# Patient Record
Sex: FEMALE | Race: WHITE | NOT HISPANIC OR LATINO | Employment: OTHER | RURAL
[De-identification: names, ages, dates, MRNs, and addresses within clinical notes are randomized per-mention and may not be internally consistent; named-entity substitution may affect disease eponyms.]

---

## 2021-04-27 RX ORDER — CYCLOBENZAPRINE HCL 10 MG
10 TABLET ORAL EVERY 8 HOURS
COMMUNITY
End: 2021-04-29 | Stop reason: SDUPTHER

## 2021-04-27 RX ORDER — MONTELUKAST SODIUM 10 MG/1
10 TABLET ORAL NIGHTLY
COMMUNITY

## 2021-04-27 RX ORDER — ONDANSETRON HYDROCHLORIDE 8 MG/1
TABLET, FILM COATED ORAL EVERY 8 HOURS PRN
COMMUNITY

## 2021-04-27 RX ORDER — POTASSIUM CHLORIDE 20 MEQ/1
20 TABLET, EXTENDED RELEASE ORAL 2 TIMES DAILY
COMMUNITY

## 2021-04-27 RX ORDER — HYDROCODONE BITARTRATE AND ACETAMINOPHEN 10; 325 MG/1; MG/1
1 TABLET ORAL EVERY 8 HOURS
COMMUNITY
End: 2021-04-29 | Stop reason: SDUPTHER

## 2021-04-27 RX ORDER — VENLAFAXINE 100 MG/1
TABLET ORAL DAILY
COMMUNITY

## 2021-04-27 RX ORDER — PRIMIDONE 50 MG/1
TABLET ORAL EVERY 8 HOURS
COMMUNITY

## 2021-04-29 ENCOUNTER — OFFICE VISIT (OUTPATIENT)
Dept: PAIN MEDICINE | Facility: CLINIC | Age: 76
End: 2021-04-29
Payer: MEDICARE

## 2021-04-29 VITALS
BODY MASS INDEX: 24.24 KG/M2 | RESPIRATION RATE: 18 BRPM | SYSTOLIC BLOOD PRESSURE: 105 MMHG | HEART RATE: 108 BPM | DIASTOLIC BLOOD PRESSURE: 73 MMHG | HEIGHT: 61 IN | WEIGHT: 128.38 LBS

## 2021-04-29 DIAGNOSIS — M53.3 DISORDER OF SACRUM: Chronic | ICD-10-CM

## 2021-04-29 DIAGNOSIS — M54.17 LUMBOSACRAL RADICULOPATHY: Primary | Chronic | ICD-10-CM

## 2021-04-29 DIAGNOSIS — G89.4 CHRONIC PAIN SYNDROME: Chronic | ICD-10-CM

## 2021-04-29 DIAGNOSIS — M47.814 THORACIC SPONDYLOSIS: Chronic | ICD-10-CM

## 2021-04-29 PROCEDURE — 99214 OFFICE O/P EST MOD 30 MIN: CPT | Mod: PBBFAC,25 | Performed by: PHYSICIAN ASSISTANT

## 2021-04-29 PROCEDURE — 99214 OFFICE O/P EST MOD 30 MIN: CPT | Mod: S$PBB,25,, | Performed by: PHYSICIAN ASSISTANT

## 2021-04-29 PROCEDURE — 99214 PR OFFICE/OUTPT VISIT, EST, LEVL IV, 30-39 MIN: ICD-10-PCS | Mod: S$PBB,25,, | Performed by: PHYSICIAN ASSISTANT

## 2021-04-29 PROCEDURE — 96372 THER/PROPH/DIAG INJ SC/IM: CPT | Mod: PBBFAC | Performed by: PHYSICIAN ASSISTANT

## 2021-04-29 PROCEDURE — 99999 PR PBB SHADOW E&M-EST. PATIENT-LVL IV: CPT | Mod: PBBFAC,,, | Performed by: PHYSICIAN ASSISTANT

## 2021-04-29 PROCEDURE — 99999 PR PBB SHADOW E&M-EST. PATIENT-LVL IV: ICD-10-PCS | Mod: PBBFAC,,, | Performed by: PHYSICIAN ASSISTANT

## 2021-04-29 RX ORDER — CYCLOBENZAPRINE HCL 10 MG
10 TABLET ORAL EVERY 8 HOURS
Qty: 90 TABLET | Refills: 0 | Status: SHIPPED | OUTPATIENT
Start: 2021-04-29 | End: 2021-05-29

## 2021-04-29 RX ORDER — KETOROLAC TROMETHAMINE 30 MG/ML
60 INJECTION, SOLUTION INTRAMUSCULAR; INTRAVENOUS
Status: COMPLETED | OUTPATIENT
Start: 2021-04-29 | End: 2021-04-29

## 2021-04-29 RX ORDER — HYDROCODONE BITARTRATE AND ACETAMINOPHEN 10; 325 MG/1; MG/1
1 TABLET ORAL EVERY 8 HOURS
Qty: 90 TABLET | Refills: 0 | Status: SHIPPED | OUTPATIENT
Start: 2021-05-07 | End: 2021-06-06

## 2021-04-29 RX ADMIN — KETOROLAC TROMETHAMINE 60 MG: 30 INJECTION, SOLUTION INTRAMUSCULAR at 01:04

## 2021-05-04 ENCOUNTER — HOSPITAL ENCOUNTER (OUTPATIENT)
Dept: RADIOLOGY | Facility: HOSPITAL | Age: 76
Discharge: HOME | End: 2021-05-04
Attending: ORTHOPAEDIC SURGERY
Payer: MEDICARE

## 2021-05-04 DIAGNOSIS — Z47.89 ORTHOPEDIC AFTERCARE: ICD-10-CM

## 2021-05-04 PROCEDURE — 73552 X-RAY EXAM OF FEMUR 2/>: CPT | Mod: TC,RT

## 2021-06-01 ENCOUNTER — OFFICE VISIT (OUTPATIENT)
Dept: PAIN MEDICINE | Facility: CLINIC | Age: 76
End: 2021-06-01
Payer: MEDICARE

## 2021-06-01 VITALS
RESPIRATION RATE: 18 BRPM | HEART RATE: 101 BPM | BODY MASS INDEX: 24.17 KG/M2 | DIASTOLIC BLOOD PRESSURE: 78 MMHG | HEIGHT: 61 IN | SYSTOLIC BLOOD PRESSURE: 131 MMHG | WEIGHT: 128 LBS

## 2021-06-01 DIAGNOSIS — M79.7 FIBROMYALGIA SYNDROME: Chronic | ICD-10-CM

## 2021-06-01 DIAGNOSIS — M47.817 LUMBOSACRAL SPONDYLOSIS WITHOUT MYELOPATHY: Primary | Chronic | ICD-10-CM

## 2021-06-01 DIAGNOSIS — M53.3 DISORDER OF SACRUM: Chronic | ICD-10-CM

## 2021-06-01 DIAGNOSIS — M47.814 THORACIC SPONDYLOSIS: Chronic | ICD-10-CM

## 2021-06-01 DIAGNOSIS — Z79.899 ENCOUNTER FOR LONG-TERM (CURRENT) USE OF OTHER MEDICATIONS: ICD-10-CM

## 2021-06-01 DIAGNOSIS — M54.17 LUMBOSACRAL RADICULOPATHY: Chronic | ICD-10-CM

## 2021-06-01 LAB
CTP QC/QA: YES
POC (AMP) AMPHETAMINE: NEGATIVE
POC (BAR) BARBITURATES: ABNORMAL
POC (BUP) BUPRENORPHINE: NEGATIVE
POC (BZO) BENZODIAZEPINES: NEGATIVE
POC (COC) COCAINE: NEGATIVE
POC (MDMA) METHYLENEDIOXYMETHAMPHETAMINE 3,4: NEGATIVE
POC (MET) METHAMPHETAMINE: NEGATIVE
POC (MOP) OPIATES: ABNORMAL
POC (MTD) METHADONE: NEGATIVE
POC (OXY) OXYCODONE: NEGATIVE
POC (PCP) PHENCYCLIDINE: NEGATIVE
POC (TCA) TRICYCLIC ANTIDEPRESSANTS: NEGATIVE
POC TEMPERATURE (URINE): 92
POC THC: NEGATIVE

## 2021-06-01 PROCEDURE — 99215 OFFICE O/P EST HI 40 MIN: CPT | Mod: PBBFAC | Performed by: PHYSICIAN ASSISTANT

## 2021-06-01 PROCEDURE — 80305 DRUG TEST PRSMV DIR OPT OBS: CPT | Mod: PBBFAC | Performed by: PHYSICIAN ASSISTANT

## 2021-06-01 PROCEDURE — 99214 OFFICE O/P EST MOD 30 MIN: CPT | Mod: S$PBB,,, | Performed by: PHYSICIAN ASSISTANT

## 2021-06-01 PROCEDURE — G0481 DRUG TEST DEF 8-14 CLASSES: HCPCS | Mod: ,,, | Performed by: CLINICAL MEDICAL LABORATORY

## 2021-06-01 PROCEDURE — 99214 PR OFFICE/OUTPT VISIT, EST, LEVL IV, 30-39 MIN: ICD-10-PCS | Mod: S$PBB,,, | Performed by: PHYSICIAN ASSISTANT

## 2021-06-01 PROCEDURE — G0481 DRUG SCREEN DEFINITIVE 14, URINE: ICD-10-PCS | Mod: ,,, | Performed by: CLINICAL MEDICAL LABORATORY

## 2021-06-01 RX ORDER — CYCLOBENZAPRINE HCL 10 MG
10 TABLET ORAL EVERY 8 HOURS
COMMUNITY
End: 2021-06-01 | Stop reason: SDUPTHER

## 2021-06-01 RX ORDER — CYCLOBENZAPRINE HCL 10 MG
10 TABLET ORAL EVERY 6 HOURS
Qty: 120 TABLET | Refills: 0 | Status: SHIPPED | OUTPATIENT
Start: 2021-06-01 | End: 2021-07-01

## 2021-06-01 RX ORDER — HYDROCODONE BITARTRATE AND ACETAMINOPHEN 10; 325 MG/1; MG/1
1 TABLET ORAL EVERY 8 HOURS
Qty: 90 TABLET | Refills: 0 | Status: SHIPPED | OUTPATIENT
Start: 2021-06-04 | End: 2021-07-04

## 2021-06-04 LAB
6-ACETYLMORPHINE, URINE (RUSH): NEGATIVE 10 NG/ML
7-AMINOCLONAZEPAM, URINE (RUSH): NEGATIVE 25 NG/ML
A-HYDROXYALPRAZOLAM, URINE (RUSH): NEGATIVE 25 NG/ML
ACETYL FENTANYL, URINE (RUSH): NEGATIVE 2.5 NG/ML
ACETYL NORFENTANYL OXALATE, URINE (RUSH): NEGATIVE 5 NG/ML
AMPHET UR QL SCN: NEGATIVE 100 NG/ML
BENZOYLECGONINE, URINE (RUSH): NEGATIVE 100 NG/ML
BUPRENORPHINE UR QL SCN: NEGATIVE 25 NG/ML
CODEINE, URINE (RUSH): NEGATIVE 25 NG/ML
CREAT UR-MCNC: 27 MG/DL (ref 28–219)
EDDP, URINE (RUSH): NEGATIVE 25 NG/ML
FENTANYL, URINE (RUSH): NEGATIVE 2.5 NG/ML
HYDROCODONE, URINE (RUSH): >250 25 NG/ML
HYDROMORPHONE, URINE (RUSH): 41.3 25 NG/ML
LORAZEPAM, URINE (RUSH): NEGATIVE 25 NG/ML
METHADONE UR QL SCN: NEGATIVE 25 NG/ML
METHAMPHET UR QL SCN: NEGATIVE 100 NG/ML
MORPHINE, URINE (RUSH): NEGATIVE 25 NG/ML
NORBUPRENORPHINE, URINE (RUSH): NEGATIVE 25 NG/ML
NORDIAZEPAM, URINE (RUSH): NEGATIVE 25 NG/ML
NORFENTANYL OXALATE, URINE (RUSH): NEGATIVE 5 NG/ML
NORHYDROCODONE, URINE (RUSH): >500 50 NG/ML
NOROXYCODONE HCL, URINE (RUSH): NEGATIVE 50 NG/ML
OXAZEPAM, URINE (RUSH): NEGATIVE 25 NG/ML
OXYCODONE UR QL SCN: NEGATIVE 25 NG/ML
OXYMORPHONE, URINE (RUSH): NEGATIVE 25 NG/ML
PH UR STRIP: 7 PH UNITS
SP GR UR STRIP: 1.01
TAPENTADOL, URINE (RUSH): NEGATIVE 25 NG/ML
TEMAZEPAM, URINE (RUSH): NEGATIVE 25 NG/ML
THC-COOH, URINE (RUSH): NEGATIVE 25 NG/ML
TRAMADOL, URINE (RUSH): NEGATIVE 100 NG/ML

## 2021-06-22 ENCOUNTER — ANESTHESIA (OUTPATIENT)
Dept: PAIN MEDICINE | Facility: HOSPITAL | Age: 76
End: 2021-06-22
Payer: MEDICARE

## 2021-06-22 ENCOUNTER — ANESTHESIA EVENT (OUTPATIENT)
Dept: PAIN MEDICINE | Facility: HOSPITAL | Age: 76
End: 2021-06-22
Payer: MEDICARE

## 2021-06-22 ENCOUNTER — HOSPITAL ENCOUNTER (OUTPATIENT)
Facility: HOSPITAL | Age: 76
Discharge: HOME | End: 2021-06-22
Attending: PAIN MEDICINE | Admitting: PAIN MEDICINE
Payer: MEDICARE

## 2021-06-22 VITALS
OXYGEN SATURATION: 97 % | HEIGHT: 61 IN | SYSTOLIC BLOOD PRESSURE: 135 MMHG | BODY MASS INDEX: 23.86 KG/M2 | DIASTOLIC BLOOD PRESSURE: 64 MMHG | WEIGHT: 126.38 LBS | HEART RATE: 92 BPM | RESPIRATION RATE: 18 BRPM | TEMPERATURE: 97 F

## 2021-06-22 DIAGNOSIS — M47.817 SPONDYLOSIS OF LUMBOSACRAL REGION WITHOUT MYELOPATHY OR RADICULOPATHY: ICD-10-CM

## 2021-06-22 PROCEDURE — 64494 INJ PARAVERT F JNT L/S 2 LEV: CPT | Mod: 50,,, | Performed by: PAIN MEDICINE

## 2021-06-22 PROCEDURE — 64493 INJ PARAVERT F JNT L/S 1 LEV: CPT | Mod: 50,,, | Performed by: PAIN MEDICINE

## 2021-06-22 PROCEDURE — 64493 INJ PARAVERT F JNT L/S 1 LEV: CPT | Mod: 50,59 | Performed by: PAIN MEDICINE

## 2021-06-22 PROCEDURE — 64495 INJ PARAVERT F JNT L/S 3 LEV: CPT | Mod: 50,GZ,, | Performed by: PAIN MEDICINE

## 2021-06-22 PROCEDURE — 64493 PR INJ DX/THER AGNT PARAVERT FACET JOINT,IMG GUIDE,LUMBAR/SAC,1ST LVL: ICD-10-PCS | Mod: 50,,, | Performed by: PAIN MEDICINE

## 2021-06-22 PROCEDURE — 25000003 PHARM REV CODE 250: Performed by: PAIN MEDICINE

## 2021-06-22 PROCEDURE — 37000009 HC ANESTHESIA EA ADD 15 MINS: Performed by: PAIN MEDICINE

## 2021-06-22 PROCEDURE — D9220A PRA ANESTHESIA: ICD-10-PCS | Mod: ,,, | Performed by: NURSE ANESTHETIST, CERTIFIED REGISTERED

## 2021-06-22 PROCEDURE — D9220A PRA ANESTHESIA: Mod: ,,, | Performed by: NURSE ANESTHETIST, CERTIFIED REGISTERED

## 2021-06-22 PROCEDURE — 27201423 OPTIME MED/SURG SUP & DEVICES STERILE SUPPLY: Performed by: PAIN MEDICINE

## 2021-06-22 PROCEDURE — 64494 INJ PARAVERT F JNT L/S 2 LEV: CPT | Mod: 50,59 | Performed by: PAIN MEDICINE

## 2021-06-22 PROCEDURE — 63600175 PHARM REV CODE 636 W HCPCS: Performed by: PAIN MEDICINE

## 2021-06-22 PROCEDURE — 01935 HC ANESTHESIA 1ST 15 MINUTES: CPT | Performed by: PAIN MEDICINE

## 2021-06-22 PROCEDURE — 25000003 PHARM REV CODE 250: Performed by: NURSE ANESTHETIST, CERTIFIED REGISTERED

## 2021-06-22 PROCEDURE — 64495 PR INJ DX/THER AGNT PARAVERT FACET JOINT,IMG GUIDE,LUMBAR/SAC, ADD LEVEL: ICD-10-PCS | Mod: 50,GZ,, | Performed by: PAIN MEDICINE

## 2021-06-22 PROCEDURE — 63600175 PHARM REV CODE 636 W HCPCS: Performed by: NURSE ANESTHETIST, CERTIFIED REGISTERED

## 2021-06-22 PROCEDURE — 27000284 HC CANNULA NASAL: Performed by: NURSE ANESTHETIST, CERTIFIED REGISTERED

## 2021-06-22 PROCEDURE — 01935 HC ANESTHESIA EA ADD 15 MINS: CPT | Performed by: PAIN MEDICINE

## 2021-06-22 PROCEDURE — 64494 PR INJ DX/THER AGNT PARAVERT FACET JOINT,IMG GUIDE,LUMBAR/SAC, 2ND LEVEL: ICD-10-PCS | Mod: 50,,, | Performed by: PAIN MEDICINE

## 2021-06-22 PROCEDURE — 37000008 HC ANESTHESIA 1ST 15 MINUTES: Performed by: PAIN MEDICINE

## 2021-06-22 PROCEDURE — 64495 INJ PARAVERT F JNT L/S 3 LEV: CPT | Mod: 50,GZ | Performed by: PAIN MEDICINE

## 2021-06-22 RX ORDER — TRIAMCINOLONE ACETONIDE 40 MG/ML
INJECTION, SUSPENSION INTRA-ARTICULAR; INTRAMUSCULAR
Status: DISCONTINUED | OUTPATIENT
Start: 2021-06-22 | End: 2021-06-22 | Stop reason: HOSPADM

## 2021-06-22 RX ORDER — PROPOFOL 10 MG/ML
VIAL (ML) INTRAVENOUS
Status: DISCONTINUED | OUTPATIENT
Start: 2021-06-22 | End: 2021-06-22

## 2021-06-22 RX ORDER — BUPIVACAINE HYDROCHLORIDE 2.5 MG/ML
INJECTION, SOLUTION INFILTRATION; PERINEURAL
Status: DISCONTINUED | OUTPATIENT
Start: 2021-06-22 | End: 2021-06-22 | Stop reason: HOSPADM

## 2021-06-22 RX ORDER — SODIUM CHLORIDE 9 MG/ML
INJECTION, SOLUTION INTRAVENOUS CONTINUOUS PRN
Status: COMPLETED | OUTPATIENT
Start: 2021-06-22 | End: 2021-06-22

## 2021-06-22 RX ORDER — SODIUM CHLORIDE 9 MG/ML
INJECTION, SOLUTION INTRAVENOUS CONTINUOUS
Status: DISCONTINUED | OUTPATIENT
Start: 2021-06-22 | End: 2021-06-22 | Stop reason: HOSPADM

## 2021-06-22 RX ORDER — HYDROCODONE BITARTRATE AND ACETAMINOPHEN 10; 325 MG/1; MG/1
1 TABLET ORAL EVERY 8 HOURS PRN
Qty: 90 TABLET | Refills: 0 | Status: SHIPPED | OUTPATIENT
Start: 2021-07-02 | End: 2021-07-14

## 2021-06-22 RX ORDER — LIDOCAINE HYDROCHLORIDE 20 MG/ML
INJECTION, SOLUTION EPIDURAL; INFILTRATION; INTRACAUDAL; PERINEURAL
Status: DISCONTINUED | OUTPATIENT
Start: 2021-06-22 | End: 2021-06-22

## 2021-06-22 RX ADMIN — PROPOFOL 70 MG: 10 INJECTION, EMULSION INTRAVENOUS at 02:06

## 2021-06-22 RX ADMIN — SODIUM CHLORIDE: 9 INJECTION, SOLUTION INTRAVENOUS at 02:06

## 2021-06-22 RX ADMIN — PROPOFOL 20 MG: 10 INJECTION, EMULSION INTRAVENOUS at 02:06

## 2021-06-22 RX ADMIN — LIDOCAINE HYDROCHLORIDE 40 MG: 20 INJECTION, SOLUTION EPIDURAL; INFILTRATION; INTRACAUDAL; PERINEURAL at 02:06

## 2021-07-08 RX ORDER — CYCLOBENZAPRINE HCL 10 MG
10 TABLET ORAL EVERY 6 HOURS
COMMUNITY
End: 2021-07-08 | Stop reason: SDUPTHER

## 2021-07-08 RX ORDER — CYCLOBENZAPRINE HCL 10 MG
10 TABLET ORAL 3 TIMES DAILY
Qty: 90 TABLET | Refills: 0 | Status: SHIPPED | OUTPATIENT
Start: 2021-07-08 | End: 2021-07-14 | Stop reason: SDUPTHER

## 2021-07-14 ENCOUNTER — OFFICE VISIT (OUTPATIENT)
Dept: PAIN MEDICINE | Facility: CLINIC | Age: 76
End: 2021-07-14
Payer: MEDICARE

## 2021-07-14 VITALS
BODY MASS INDEX: 23.79 KG/M2 | DIASTOLIC BLOOD PRESSURE: 98 MMHG | HEART RATE: 111 BPM | SYSTOLIC BLOOD PRESSURE: 142 MMHG | WEIGHT: 126 LBS | RESPIRATION RATE: 18 BRPM | HEIGHT: 61 IN

## 2021-07-14 DIAGNOSIS — M47.814 THORACIC SPONDYLOSIS: Chronic | ICD-10-CM

## 2021-07-14 DIAGNOSIS — M53.3 DISORDER OF SACRUM: Chronic | ICD-10-CM

## 2021-07-14 DIAGNOSIS — M47.817 LUMBOSACRAL SPONDYLOSIS WITHOUT MYELOPATHY: Primary | Chronic | ICD-10-CM

## 2021-07-14 PROCEDURE — 99214 OFFICE O/P EST MOD 30 MIN: CPT | Mod: S$PBB,,, | Performed by: PHYSICIAN ASSISTANT

## 2021-07-14 PROCEDURE — 99214 OFFICE O/P EST MOD 30 MIN: CPT | Mod: PBBFAC | Performed by: PHYSICIAN ASSISTANT

## 2021-07-14 PROCEDURE — 99214 PR OFFICE/OUTPT VISIT, EST, LEVL IV, 30-39 MIN: ICD-10-PCS | Mod: S$PBB,,, | Performed by: PHYSICIAN ASSISTANT

## 2021-07-14 RX ORDER — HYDROCODONE BITARTRATE AND ACETAMINOPHEN 7.5; 325 MG/1; MG/1
1 TABLET ORAL EVERY 8 HOURS
Qty: 90 TABLET | Refills: 0 | Status: SHIPPED | OUTPATIENT
Start: 2021-07-30 | End: 2021-08-29

## 2021-07-14 RX ORDER — HYDROCODONE BITARTRATE AND ACETAMINOPHEN 10; 325 MG/1; MG/1
1 TABLET ORAL EVERY 8 HOURS
Qty: 90 TABLET | Refills: 0 | Status: CANCELLED | OUTPATIENT
Start: 2021-07-14 | End: 2021-08-13

## 2021-07-14 RX ORDER — CYCLOBENZAPRINE HCL 10 MG
10 TABLET ORAL EVERY 8 HOURS PRN
Qty: 90 TABLET | Refills: 1 | Status: SHIPPED | OUTPATIENT
Start: 2021-07-14 | End: 2021-09-14 | Stop reason: SDUPTHER

## 2021-07-14 RX ORDER — DULOXETIN HYDROCHLORIDE 30 MG/1
30 CAPSULE, DELAYED RELEASE ORAL DAILY
COMMUNITY

## 2021-07-14 RX ORDER — HYDROCODONE BITARTRATE AND ACETAMINOPHEN 7.5; 325 MG/1; MG/1
1 TABLET ORAL EVERY 8 HOURS
Qty: 90 TABLET | Refills: 0 | Status: SHIPPED | OUTPATIENT
Start: 2021-08-31 | End: 2021-09-30

## 2021-09-14 ENCOUNTER — OFFICE VISIT (OUTPATIENT)
Dept: PAIN MEDICINE | Facility: CLINIC | Age: 76
End: 2021-09-14
Payer: MEDICARE

## 2021-09-14 VITALS
WEIGHT: 144 LBS | SYSTOLIC BLOOD PRESSURE: 132 MMHG | DIASTOLIC BLOOD PRESSURE: 86 MMHG | HEIGHT: 60 IN | HEART RATE: 121 BPM | BODY MASS INDEX: 28.27 KG/M2

## 2021-09-14 DIAGNOSIS — M47.817 LUMBOSACRAL SPONDYLOSIS WITHOUT MYELOPATHY: Primary | Chronic | ICD-10-CM

## 2021-09-14 DIAGNOSIS — M47.814 THORACIC SPONDYLOSIS: Chronic | ICD-10-CM

## 2021-09-14 DIAGNOSIS — M53.3 DISORDER OF SACRUM: Chronic | ICD-10-CM

## 2021-09-14 PROCEDURE — 99214 PR OFFICE/OUTPT VISIT, EST, LEVL IV, 30-39 MIN: ICD-10-PCS | Mod: S$PBB,,, | Performed by: PHYSICIAN ASSISTANT

## 2021-09-14 PROCEDURE — 99214 OFFICE O/P EST MOD 30 MIN: CPT | Mod: PBBFAC | Performed by: PHYSICIAN ASSISTANT

## 2021-09-14 PROCEDURE — 99214 OFFICE O/P EST MOD 30 MIN: CPT | Mod: S$PBB,,, | Performed by: PHYSICIAN ASSISTANT

## 2021-09-14 RX ORDER — HYDROCODONE BITARTRATE AND ACETAMINOPHEN 7.5; 325 MG/1; MG/1
1 TABLET ORAL EVERY 8 HOURS
Qty: 90 TABLET | Refills: 0 | Status: CANCELLED | OUTPATIENT
Start: 2021-09-14 | End: 2021-10-14

## 2021-09-14 RX ORDER — CYCLOBENZAPRINE HCL 10 MG
10 TABLET ORAL EVERY 8 HOURS PRN
Qty: 90 TABLET | Refills: 5 | Status: SHIPPED | OUTPATIENT
Start: 2021-09-14 | End: 2022-05-02 | Stop reason: SDUPTHER

## 2022-05-02 ENCOUNTER — OFFICE VISIT (OUTPATIENT)
Dept: PAIN MEDICINE | Facility: CLINIC | Age: 77
End: 2022-05-02
Payer: MEDICARE

## 2022-05-02 VITALS
WEIGHT: 137 LBS | HEART RATE: 111 BPM | DIASTOLIC BLOOD PRESSURE: 96 MMHG | RESPIRATION RATE: 17 BRPM | HEIGHT: 61 IN | SYSTOLIC BLOOD PRESSURE: 159 MMHG | BODY MASS INDEX: 25.86 KG/M2

## 2022-05-02 DIAGNOSIS — M47.817 LUMBOSACRAL SPONDYLOSIS WITHOUT MYELOPATHY: Primary | Chronic | ICD-10-CM

## 2022-05-02 DIAGNOSIS — Z79.899 ENCOUNTER FOR LONG-TERM (CURRENT) USE OF OTHER MEDICATIONS: ICD-10-CM

## 2022-05-02 DIAGNOSIS — M47.814 THORACIC SPONDYLOSIS: Chronic | ICD-10-CM

## 2022-05-02 DIAGNOSIS — M53.3 DISORDER OF SACRUM: Chronic | ICD-10-CM

## 2022-05-02 LAB
CTP QC/QA: YES
POC (AMP) AMPHETAMINE: NEGATIVE
POC (BAR) BARBITURATES: ABNORMAL
POC (BUP) BUPRENORPHINE: NEGATIVE
POC (BZO) BENZODIAZEPINES: NEGATIVE
POC (COC) COCAINE: NEGATIVE
POC (MDMA) METHYLENEDIOXYMETHAMPHETAMINE 3,4: NEGATIVE
POC (MET) METHAMPHETAMINE: NEGATIVE
POC (MOP) OPIATES: NEGATIVE
POC (MTD) METHADONE: NEGATIVE
POC (OXY) OXYCODONE: NEGATIVE
POC (PCP) PHENCYCLIDINE: NEGATIVE
POC (TCA) TRICYCLIC ANTIDEPRESSANTS: NEGATIVE
POC TEMPERATURE (URINE): 94
POC THC: NEGATIVE

## 2022-05-02 PROCEDURE — 80305 DRUG TEST PRSMV DIR OPT OBS: CPT | Mod: PBBFAC | Performed by: PHYSICIAN ASSISTANT

## 2022-05-02 PROCEDURE — 99214 OFFICE O/P EST MOD 30 MIN: CPT | Mod: S$PBB,25,, | Performed by: PHYSICIAN ASSISTANT

## 2022-05-02 PROCEDURE — 96372 THER/PROPH/DIAG INJ SC/IM: CPT | Mod: PBBFAC | Performed by: PHYSICIAN ASSISTANT

## 2022-05-02 PROCEDURE — 99214 PR OFFICE/OUTPT VISIT, EST, LEVL IV, 30-39 MIN: ICD-10-PCS | Mod: S$PBB,25,, | Performed by: PHYSICIAN ASSISTANT

## 2022-05-02 PROCEDURE — G0481 DRUG TEST DEF 8-14 CLASSES: HCPCS | Mod: ,,, | Performed by: CLINICAL MEDICAL LABORATORY

## 2022-05-02 PROCEDURE — G0481 PR DRUG TEST DEF 8-14 CLASSES: ICD-10-PCS | Mod: ,,, | Performed by: CLINICAL MEDICAL LABORATORY

## 2022-05-02 PROCEDURE — 99214 OFFICE O/P EST MOD 30 MIN: CPT | Mod: PBBFAC,25 | Performed by: PHYSICIAN ASSISTANT

## 2022-05-02 RX ORDER — ACETAMINOPHEN AND CODEINE PHOSPHATE 300; 30 MG/1; MG/1
1 TABLET ORAL EVERY 8 HOURS
Qty: 90 TABLET | Refills: 2 | Status: SHIPPED | OUTPATIENT
Start: 2022-05-02 | End: 2022-06-15 | Stop reason: SDUPTHER

## 2022-05-02 RX ORDER — AMITRIPTYLINE HYDROCHLORIDE 50 MG/1
50 TABLET, FILM COATED ORAL NIGHTLY PRN
Qty: 30 TABLET | Refills: 2 | Status: SHIPPED | OUTPATIENT
Start: 2022-05-02 | End: 2022-06-15 | Stop reason: SDUPTHER

## 2022-05-02 RX ORDER — KETOROLAC TROMETHAMINE 30 MG/ML
60 INJECTION, SOLUTION INTRAMUSCULAR; INTRAVENOUS
Status: COMPLETED | OUTPATIENT
Start: 2022-05-02 | End: 2022-05-02

## 2022-05-02 RX ORDER — CYCLOBENZAPRINE HCL 10 MG
10 TABLET ORAL EVERY 8 HOURS PRN
Qty: 90 TABLET | Refills: 5 | Status: SHIPPED | OUTPATIENT
Start: 2022-05-02 | End: 2022-06-15 | Stop reason: SDUPTHER

## 2022-05-02 RX ADMIN — KETOROLAC TROMETHAMINE 60 MG: 30 INJECTION, SOLUTION INTRAMUSCULAR at 02:05

## 2022-05-02 NOTE — PROGRESS NOTES
Disclaimer:  This note has been generated using voice recognition software.  There may be type of graft focal areas that have been missed during a proof reading      Subjective:      Patient ID: Martina Guerra is a 76 y.o. female.    Chief Complaint: Low-back Pain, Neck Pain, and Shoulder Pain      Pain  This is a chronic problem. The current episode started more than 1 year ago. The problem occurs daily. The problem has been waxing and waning. Associated symptoms include arthralgias. Pertinent negatives include no change in bowel habit, chest pain, chills, coughing, diaphoresis, fever, rash, sore throat, vertigo or vomiting.     Review of Systems   Constitutional: Negative for appetite change, chills, diaphoresis, fever and unexpected weight change.   HENT: Negative for drooling, ear discharge, ear pain, facial swelling, nosebleeds, sore throat, trouble swallowing, voice change and goiter.    Eyes: Negative for photophobia, pain, discharge, redness and visual disturbance.   Respiratory: Negative for apnea, cough, choking, chest tightness, shortness of breath, wheezing and stridor.    Cardiovascular: Negative for chest pain, palpitations and leg swelling.   Gastrointestinal: Negative for abdominal distention, change in bowel habit, diarrhea, rectal pain, vomiting, fecal incontinence and change in bowel habit.   Endocrine: Negative for cold intolerance, heat intolerance, polydipsia, polyphagia and polyuria.   Genitourinary: Negative for bladder incontinence, dysuria, flank pain, frequency and hot flashes.   Musculoskeletal: Positive for arthralgias, back pain, leg pain and neck stiffness.   Integumentary:  Negative for color change, pallor and rash.   Allergic/Immunologic: Negative for immunocompromised state.   Neurological: Negative for dizziness, vertigo, seizures, syncope, facial asymmetry, speech difficulty, light-headedness, disturbances in coordination, memory loss and coordination difficulties.  "  Hematological: Negative for adenopathy. Does not bruise/bleed easily.   Psychiatric/Behavioral: Negative for agitation, behavioral problems, confusion, decreased concentration, dysphoric mood, hallucinations, self-injury and suicidal ideas. The patient is not nervous/anxious and is not hyperactive.             Objective:  Vitals:    05/02/22 1349   BP: (!) 159/96   Pulse: (!) 111   Resp: 17   Weight: 62.1 kg (137 lb)   Height: 5' 1" (1.549 m)   PainSc: 10-Worst pain ever         Physical Exam  Vitals and nursing note reviewed. Exam conducted with a chaperone present.   Constitutional:       General: She is awake. She is not in acute distress.     Appearance: Normal appearance. She is not toxic-appearing.   HENT:      Head: Normocephalic and atraumatic.      Nose: Nose normal.      Mouth/Throat:      Mouth: Mucous membranes are moist.      Pharynx: Oropharynx is clear.   Eyes:      Conjunctiva/sclera: Conjunctivae normal.      Pupils: Pupils are equal, round, and reactive to light.   Cardiovascular:      Rate and Rhythm: Normal rate.   Pulmonary:      Effort: Pulmonary effort is normal. No respiratory distress.   Abdominal:      Palpations: Abdomen is soft.   Musculoskeletal:         General: Normal range of motion.      Cervical back: Normal range of motion and neck supple. Tenderness present.      Lumbar back: Tenderness present.   Skin:     General: Skin is warm and dry.      Coloration: Skin is not jaundiced or pale.   Neurological:      General: No focal deficit present.      Mental Status: She is alert and oriented to person, place, and time. Mental status is at baseline.      Cranial Nerves: Cranial nerves are intact. No cranial nerve deficit (II-XII).   Psychiatric:         Mood and Affect: Mood normal.         Behavior: Behavior normal. Behavior is cooperative.         Thought Content: Thought content normal.           Orders Placed This Encounter   Procedures    Drug Screen Definitive 14, Urine     " Standing Status:   Future     Number of Occurrences:   1     Standing Expiration Date:   7/1/2023     Order Specific Question:   Specimen Source     Answer:   Urine    POCT Urine Drug Screen Presump     Interpretive Information:     Negative:  No drug detected at the cut off level.   Positive:  This result represents presumptive positive for the   tested drug, other substances may yield a positive response other   than the analyte of interest. This result should be utilized for   diagnostic purpose only. Confirmation testing will be performed upon physician request only.           [facility]  See OP Notes for results.     IMPRESSION: See OP Notes for results.     This procedure was auto-finalized by: Virtual Radiologist       No visits with results within 6 Month(s) from this visit.   Latest known visit with results is:   Office Visit on 06/01/2021   Component Date Value Ref Range Status    POC Amphetamines 06/01/2021 Negative  Negative, Inconclusive Final    POC Barbiturates 06/01/2021 Presumptive Positive (A) Negative, Inconclusive Final    POC Benzodiazepines 06/01/2021 Negative  Negative, Inconclusive Final    POC Cocaine 06/01/2021 Negative  Negative, Inconclusive Final    POC THC 06/01/2021 Negative  Negative, Inconclusive Final    POC Methadone 06/01/2021 Negative  Negative, Inconclusive Final    POC Methamphetamine 06/01/2021 Negative  Negative, Inconclusive Final    POC Opiates 06/01/2021 Presumptive Positive (A) Negative, Inconclusive Final    POC Oxycodone 06/01/2021 Negative  Negative, Inconclusive Final    POC Phencyclidine 06/01/2021 Negative  Negative, Inconclusive Final    POC Methylenedioxymethamphetamine * 06/01/2021 Negative  Negative, Inconclusive Final    POC Tricyclic Antidepressants 06/01/2021 Negative  Negative, Inconclusive Final    POC Buprenorphine 06/01/2021 Negative   Final     Acceptable 06/01/2021 Yes   Final    POC Temperature (Urine)  06/01/2021 92   Final    pH, UA 06/01/2021 7.0  5.0, 5.5, 6.0, 6.5, 7.0, 7.5, 8.0 pH Units Final    Specific Gravity, UA 06/01/2021 1.010  <=1.005, 1.010, 1.015, 1.020, 1.025, 1.030 Final    Creatinine, Urine 06/01/2021 27 (A) 28 - 219 mg/dL Final    6-Acetylmorphine 06/01/2021 Negative  10 ng/mL Final    7-Aminoclonazepam 06/01/2021 Negative  25 ng/mL Final    a-Hydroxyalprazolam 06/01/2021 Negative  25 ng/mL Final    Acetyl Fentanyl 06/01/2021 Negative  2.5 ng/mL Final    Acetyl Norfentanyl Oxalate 06/01/2021 Negative  5 ng/mL Final    Benzoylecgonine 06/01/2021 Negative  100 ng/mL Final    Buprenorphine 06/01/2021 Negative  25 ng/mL Final    Codeine 06/01/2021 Negative  25 ng/mL Final    EDDP 06/01/2021 Negative  25 ng/mL Final    Fentanyl 06/01/2021 Negative  2.5 ng/mL Final    Hydrocodone 06/01/2021 >250.0 (A) <25.0 25 ng/mL Final    Hydromorphone 06/01/2021 41.3 (A) <25.0 25 ng/mL Final    Lorazepam 06/01/2021 Negative  25 ng/mL Final    Morphine 06/01/2021 Negative  25 ng/mL Final    Norbuprenorphine 06/01/2021 Negative  25 ng/mL Final    Nordiazepam 06/01/2021 Negative  25 ng/mL Final    Norfentanyl Oxalate 06/01/2021 Negative  5 ng/mL Final    Norhydrocodone 06/01/2021 >500.0 (A) <50.0 50 ng/mL Final    Noroxycodone HCL 06/01/2021 Negative  50 ng/mL Final    Oxazepam 06/01/2021 Negative  25 ng/mL Final    Oxymorphone 06/01/2021 Negative  25 ng/mL Final    Tapentadol 06/01/2021 Negative  25 ng/mL Final    Temazepam 06/01/2021 Negative  25 ng/mL Final    THC-COOH 06/01/2021 Negative  25 ng/mL Final    Tramadol 06/01/2021 Negative  100 ng/mL Final    Amphetamine, Urine 06/01/2021 Negative  Negative 100 ng/mL Final    Methamphetamines, Urine 06/01/2021 Negative  Negative 100 ng/mL Final    Methadone, Urine 06/01/2021 Negative  Negative 25 ng/mL Final    Oxycodone, Urine 06/01/2021 Negative  Negative 25 ng/mL Final           Assessment:      1. Lumbosacral spondylosis without  myelopathy    2. Thoracic spondylosis    3. Disorder of sacrum    4. Encounter for long-term (current) use of other medications                Requested Prescriptions     Signed Prescriptions Disp Refills    cyclobenzaprine (FLEXERIL) 10 MG tablet 90 tablet 5     Sig: Take 1 tablet (10 mg total) by mouth every 8 (eight) hours as needed for Muscle spasms.    acetaminophen-codeine 300-30mg (TYLENOL #3) 300-30 mg Tab 90 tablet 2     Sig: Take 1 tablet by mouth every 8 (eight) hours.    amitriptyline (ELAVIL) 50 MG tablet 30 tablet 2     Sig: Take 1 tablet (50 mg total) by mouth nightly as needed for Insomnia or Pain.         Plan:    Presumptive drug screen today with definitive    Complaint back pain joint pain multiple areas requesting resume some form of pain control    She wean in discontinue her Norco several months ago she states she cannot tolerate the discomfort    After discussing options wheals resume Tylenol No.  3 1 p.o. q.12 hours    Patient requesting Toradol shot for joint pain    Toradol 60 mg IM, tolerated well    Continue other medication as directed    She would like to continue her muscle action    Continue activity as directed    Continue home exercise program    Follow-up 3 months    Dr. Chaney, June 2022

## 2022-05-04 LAB
6-ACETYLMORPHINE, URINE (RUSH): NEGATIVE 10 NG/ML
7-AMINOCLONAZEPAM, URINE (RUSH): NEGATIVE 25 NG/ML
A-HYDROXYALPRAZOLAM, URINE (RUSH): NEGATIVE 25 NG/ML
ACETYL FENTANYL, URINE (RUSH): NEGATIVE 2.5 NG/ML
ACETYL NORFENTANYL OXALATE, URINE (RUSH): NEGATIVE 5 NG/ML
AMPHET UR QL SCN: NEGATIVE 100 NG/ML
BENZOYLECGONINE, URINE (RUSH): NEGATIVE 100 NG/ML
BUPRENORPHINE UR QL SCN: NEGATIVE 25 NG/ML
CODEINE, URINE (RUSH): NEGATIVE 25 NG/ML
CREAT UR-MCNC: 61 MG/DL (ref 28–219)
EDDP, URINE (RUSH): NEGATIVE 25 NG/ML
FENTANYL, URINE (RUSH): NEGATIVE 2.5 NG/ML
HYDROCODONE, URINE (RUSH): NEGATIVE 25 NG/ML
HYDROMORPHONE, URINE (RUSH): NEGATIVE 25 NG/ML
LORAZEPAM, URINE (RUSH): NEGATIVE 25 NG/ML
METHADONE UR QL SCN: NEGATIVE 25 NG/ML
METHAMPHET UR QL SCN: NEGATIVE 100 NG/ML
MORPHINE, URINE (RUSH): NEGATIVE 25 NG/ML
NORBUPRENORPHINE, URINE (RUSH): NEGATIVE 25 NG/ML
NORDIAZEPAM, URINE (RUSH): NEGATIVE 25 NG/ML
NORFENTANYL OXALATE, URINE (RUSH): NEGATIVE 5 NG/ML
NORHYDROCODONE, URINE (RUSH): NEGATIVE 50 NG/ML
NOROXYCODONE HCL, URINE (RUSH): NEGATIVE 50 NG/ML
OXAZEPAM, URINE (RUSH): NEGATIVE 25 NG/ML
OXYCODONE UR QL SCN: NEGATIVE 25 NG/ML
OXYMORPHONE, URINE (RUSH): NEGATIVE 25 NG/ML
PH UR STRIP: 6 PH UNITS
SP GR UR STRIP: 1.02
TAPENTADOL, URINE (RUSH): NEGATIVE 25 NG/ML
TEMAZEPAM, URINE (RUSH): NEGATIVE 25 NG/ML
THC-COOH, URINE (RUSH): NEGATIVE 25 NG/ML
TRAMADOL, URINE (RUSH): NEGATIVE 100 NG/ML

## 2022-05-26 ENCOUNTER — TELEPHONE (OUTPATIENT)
Dept: PAIN MEDICINE | Facility: CLINIC | Age: 77
End: 2022-05-26
Payer: MEDICARE

## 2022-05-26 NOTE — TELEPHONE ENCOUNTER
----- Message from Patricia Whitaker sent at 5/25/2022  2:18 PM CDT -----  Regarding: Med Increase  Can she increase her medication?  Please call [phone]

## 2022-06-15 ENCOUNTER — OFFICE VISIT (OUTPATIENT)
Dept: PAIN MEDICINE | Facility: CLINIC | Age: 77
End: 2022-06-15
Payer: MEDICARE

## 2022-06-15 VITALS
BODY MASS INDEX: 27.19 KG/M2 | SYSTOLIC BLOOD PRESSURE: 124 MMHG | HEART RATE: 104 BPM | WEIGHT: 144 LBS | HEIGHT: 61 IN | RESPIRATION RATE: 18 BRPM | DIASTOLIC BLOOD PRESSURE: 76 MMHG

## 2022-06-15 DIAGNOSIS — M47.817 LUMBOSACRAL SPONDYLOSIS WITHOUT MYELOPATHY: Primary | Chronic | ICD-10-CM

## 2022-06-15 DIAGNOSIS — M53.3 DISORDER OF SACRUM: Chronic | ICD-10-CM

## 2022-06-15 DIAGNOSIS — M47.814 THORACIC SPONDYLOSIS: Chronic | ICD-10-CM

## 2022-06-15 PROCEDURE — 99214 OFFICE O/P EST MOD 30 MIN: CPT | Mod: S$PBB,25,, | Performed by: PHYSICIAN ASSISTANT

## 2022-06-15 PROCEDURE — 96372 THER/PROPH/DIAG INJ SC/IM: CPT | Mod: PBBFAC | Performed by: PHYSICIAN ASSISTANT

## 2022-06-15 PROCEDURE — 99214 PR OFFICE/OUTPT VISIT, EST, LEVL IV, 30-39 MIN: ICD-10-PCS | Mod: S$PBB,25,, | Performed by: PHYSICIAN ASSISTANT

## 2022-06-15 PROCEDURE — 99213 OFFICE O/P EST LOW 20 MIN: CPT | Mod: PBBFAC,25 | Performed by: PHYSICIAN ASSISTANT

## 2022-06-15 RX ORDER — KETOROLAC TROMETHAMINE 30 MG/ML
60 INJECTION, SOLUTION INTRAMUSCULAR; INTRAVENOUS
Status: COMPLETED | OUTPATIENT
Start: 2022-06-15 | End: 2022-06-15

## 2022-06-15 RX ORDER — AMITRIPTYLINE HYDROCHLORIDE 50 MG/1
50 TABLET, FILM COATED ORAL NIGHTLY PRN
Qty: 30 TABLET | Refills: 2 | Status: SHIPPED | OUTPATIENT
Start: 2022-06-15 | End: 2022-09-20

## 2022-06-15 RX ORDER — CYCLOBENZAPRINE HCL 10 MG
10 TABLET ORAL EVERY 8 HOURS PRN
Qty: 90 TABLET | Refills: 2 | Status: SHIPPED | OUTPATIENT
Start: 2022-06-15 | End: 2022-08-15 | Stop reason: SDUPTHER

## 2022-06-15 RX ORDER — ACETAMINOPHEN AND CODEINE PHOSPHATE 300; 30 MG/1; MG/1
1 TABLET ORAL EVERY 6 HOURS PRN
Qty: 120 TABLET | Refills: 2 | Status: SHIPPED | OUTPATIENT
Start: 2022-06-30 | End: 2022-09-20

## 2022-06-15 RX ADMIN — KETOROLAC TROMETHAMINE 60 MG: 30 INJECTION, SOLUTION INTRAMUSCULAR at 02:06

## 2022-06-15 NOTE — PROGRESS NOTES
Subjective:      Patient ID: Martina Guerra is a 76 y.o. female.    Chief Complaint: Neck Pain, Low-back Pain, and Shoulder Pain (bilateral)      Pain  This is a chronic problem. The current episode started more than 1 year ago. The problem occurs daily. The problem has been unchanged. Associated symptoms include arthralgias. Pertinent negatives include no change in bowel habit, chest pain, chills, coughing, diaphoresis, fever, rash, sore throat, vertigo or vomiting.     Review of Systems   Constitutional: Negative for appetite change, chills, diaphoresis, fever and unexpected weight change.   HENT: Negative for drooling, ear discharge, ear pain, facial swelling, nosebleeds, sore throat, trouble swallowing, voice change and goiter.    Eyes: Negative for photophobia, pain, discharge, redness and visual disturbance.   Respiratory: Negative for apnea, cough, choking, chest tightness, shortness of breath, wheezing and stridor.    Cardiovascular: Negative for chest pain, palpitations and leg swelling.   Gastrointestinal: Negative for abdominal distention, change in bowel habit, diarrhea, rectal pain, vomiting, fecal incontinence and change in bowel habit.   Endocrine: Negative for cold intolerance, heat intolerance, polydipsia, polyphagia and polyuria.   Genitourinary: Negative for bladder incontinence, dysuria, flank pain, frequency and hot flashes.   Musculoskeletal: Positive for arthralgias, back pain, leg pain and neck stiffness.   Integumentary:  Negative for color change, pallor and rash.   Allergic/Immunologic: Negative for immunocompromised state.   Neurological: Negative for dizziness, vertigo, seizures, syncope, facial asymmetry, speech difficulty, light-headedness, disturbances in coordination, memory loss and coordination difficulties.   Hematological: Negative for adenopathy. Does not bruise/bleed easily.   Psychiatric/Behavioral: Negative for agitation, behavioral problems, confusion, decreased  "concentration, dysphoric mood, hallucinations, self-injury and suicidal ideas. The patient is not nervous/anxious and is not hyperactive.             Objective:  Vitals:    06/15/22 1355 06/15/22 1356   BP: 124/76    Pulse: 104    Resp: 18    Weight: 65.3 kg (144 lb)    Height: 5' 1" (1.549 m)    PainSc:   6   6         Physical Exam  Vitals and nursing note reviewed. Exam conducted with a chaperone present.   Constitutional:       General: She is awake. She is not in acute distress.     Appearance: Normal appearance. She is not toxic-appearing.   HENT:      Head: Normocephalic and atraumatic.      Nose: Nose normal.      Mouth/Throat:      Mouth: Mucous membranes are moist.      Pharynx: Oropharynx is clear.   Eyes:      Conjunctiva/sclera: Conjunctivae normal.      Pupils: Pupils are equal, round, and reactive to light.   Cardiovascular:      Rate and Rhythm: Normal rate.   Pulmonary:      Effort: Pulmonary effort is normal. No respiratory distress.   Abdominal:      Palpations: Abdomen is soft.   Musculoskeletal:         General: Normal range of motion.      Cervical back: Normal range of motion and neck supple. Tenderness present.      Lumbar back: Tenderness present.   Skin:     General: Skin is warm and dry.      Coloration: Skin is not jaundiced or pale.   Neurological:      General: No focal deficit present.      Mental Status: She is alert and oriented to person, place, and time. Mental status is at baseline.      Cranial Nerves: Cranial nerves are intact. No cranial nerve deficit (II-XII).   Psychiatric:         Mood and Affect: Mood normal.         Behavior: Behavior normal. Behavior is cooperative.         Thought Content: Thought content normal.           No orders of the defined types were placed in this encounter.       [facility]  See OP Notes for results.     IMPRESSION: See OP Notes for results.     This procedure was auto-finalized by: Virtual Radiologist       Office Visit on " 05/02/2022   Component Date Value Ref Range Status    POC Amphetamines 05/02/2022 Negative  Negative, Inconclusive Final    POC Barbiturates 05/02/2022 Presumptive Positive (A) Negative, Inconclusive Final    POC Benzodiazepines 05/02/2022 Negative  Negative, Inconclusive Final    POC Cocaine 05/02/2022 Negative  Negative, Inconclusive Final    POC THC 05/02/2022 Negative  Negative, Inconclusive Final    POC Methadone 05/02/2022 Negative  Negative, Inconclusive Final    POC Methamphetamine 05/02/2022 Negative  Negative, Inconclusive Final    POC Opiates 05/02/2022 Negative  Negative, Inconclusive Final    POC Oxycodone 05/02/2022 Negative  Negative, Inconclusive Final    POC Phencyclidine 05/02/2022 Negative  Negative, Inconclusive Final    POC Methylenedioxymethamphetamine * 05/02/2022 Negative  Negative, Inconclusive Final    POC Tricyclic Antidepressants 05/02/2022 Negative  Negative, Inconclusive Final    POC Buprenorphine 05/02/2022 Negative   Final     Acceptable 05/02/2022 Yes   Final    POC Temperature (Urine) 05/02/2022 94   Final    pH, UA 05/02/2022 6.0  5.0, 5.5, 6.0, 6.5, 7.0, 7.5, 8.0 pH Units Final    Specific Gravity, UA 05/02/2022 1.020  <=1.005, 1.010, 1.015, 1.020, 1.025, 1.030 Final    Creatinine, Urine 05/02/2022 61  28 - 219 mg/dL Final    6-Acetylmorphine 05/02/2022 Negative  10 ng/mL Final    7-Aminoclonazepam 05/02/2022 Negative  Negative 25 ng/mL Final    a-Hydroxyalprazolam 05/02/2022 Negative  25 ng/mL Final    Acetyl Fentanyl 05/02/2022 Negative  2.5 ng/mL Final    Acetyl Norfentanyl Oxalate 05/02/2022 Negative  5 ng/mL Final    Benzoylecgonine 05/02/2022 Negative  100 ng/mL Final    Buprenorphine 05/02/2022 Negative  25 ng/mL Final    Codeine 05/02/2022 Negative  25 ng/mL Final    EDDP 05/02/2022 Negative  25 ng/mL Final    Fentanyl 05/02/2022 Negative  2.5 ng/mL Final    Hydrocodone 05/02/2022 Negative  25 ng/mL Final    Hydromorphone  05/02/2022 Negative  25 ng/mL Final    Lorazepam 05/02/2022 Negative  25 ng/mL Final    Morphine 05/02/2022 Negative  25 ng/mL Final    Norbuprenorphine 05/02/2022 Negative  25 ng/mL Final    Nordiazepam 05/02/2022 Negative  25 ng/mL Final    Norfentanyl Oxalate 05/02/2022 Negative  5 ng/mL Final    Norhydrocodone 05/02/2022 Negative  50 ng/mL Final    Noroxycodone HCL 05/02/2022 Negative  50 ng/mL Final    Oxazepam 05/02/2022 Negative  25 ng/mL Final    Oxymorphone 05/02/2022 Negative  25 ng/mL Final    Tapentadol 05/02/2022 Negative  25 ng/mL Final    Temazepam 05/02/2022 Negative  25 ng/mL Final    THC-COOH 05/02/2022 Negative  25 ng/mL Final    Tramadol 05/02/2022 Negative  100 ng/mL Final    Amphetamine, Urine 05/02/2022 Negative  Negative 100 ng/mL Final    Methamphetamines, Urine 05/02/2022 Negative  Negative 100 ng/mL Final    Methadone, Urine 05/02/2022 Negative  Negative 25 ng/mL Final    Oxycodone, Urine 05/02/2022 Negative  Negative 25 ng/mL Final           Assessment:      1. Lumbosacral spondylosis without myelopathy    2. Thoracic spondylosis    3. Disorder of sacrum                Requested Prescriptions     Signed Prescriptions Disp Refills    amitriptyline (ELAVIL) 50 MG tablet 30 tablet 2     Sig: Take 1 tablet (50 mg total) by mouth nightly as needed for Insomnia or Pain.    cyclobenzaprine (FLEXERIL) 10 MG tablet 90 tablet 2     Sig: Take 1 tablet (10 mg total) by mouth every 8 (eight) hours as needed for Muscle spasms.    acetaminophen-codeine 300-30mg (TYLENOL #3) 300-30 mg Tab 120 tablet 2     Sig: Take 1 tablet by mouth every 6 (six) hours as needed (pain).         Plan:    Definitive drug screen May 2, 2022-was not using narcotics that time    Complaint of joint pain back pain requesting Toradol injection requesting adjustment pain medication she states Tylenol 3 is helping is just not lasting long enough during the day    After discussing options      Tylenol No.  3 1  p.o. q.6 hours    Patient requesting Toradol shot for joint pain back pain    Toradol 60 mg IM, tolerated well    Continue other medication as directed    Continue activity as directed    Follow-up 3 months    Dr. Chaney, June 2022

## 2022-08-15 ENCOUNTER — OFFICE VISIT (OUTPATIENT)
Dept: PAIN MEDICINE | Facility: CLINIC | Age: 77
End: 2022-08-15
Payer: MEDICARE

## 2022-08-15 VITALS
HEART RATE: 92 BPM | BODY MASS INDEX: 28.51 KG/M2 | SYSTOLIC BLOOD PRESSURE: 160 MMHG | HEIGHT: 61 IN | WEIGHT: 151 LBS | DIASTOLIC BLOOD PRESSURE: 86 MMHG

## 2022-08-15 DIAGNOSIS — M47.814 THORACIC SPONDYLOSIS: Chronic | ICD-10-CM

## 2022-08-15 DIAGNOSIS — Z79.899 ENCOUNTER FOR LONG-TERM (CURRENT) USE OF OTHER MEDICATIONS: Primary | ICD-10-CM

## 2022-08-15 DIAGNOSIS — M47.817 LUMBOSACRAL SPONDYLOSIS WITHOUT MYELOPATHY: Chronic | ICD-10-CM

## 2022-08-15 DIAGNOSIS — M53.3 DISORDER OF SACRUM: Chronic | ICD-10-CM

## 2022-08-15 LAB
CTP QC/QA: YES
POC (AMP) AMPHETAMINE: NEGATIVE
POC (BAR) BARBITURATES: NEGATIVE
POC (BUP) BUPRENORPHINE: NEGATIVE
POC (BZO) BENZODIAZEPINES: NEGATIVE
POC (COC) COCAINE: NEGATIVE
POC (MDMA) METHYLENEDIOXYMETHAMPHETAMINE 3,4: NEGATIVE
POC (MET) METHAMPHETAMINE: NEGATIVE
POC (MOP) OPIATES: ABNORMAL
POC (MTD) METHADONE: NEGATIVE
POC (OXY) OXYCODONE: NEGATIVE
POC (PCP) PHENCYCLIDINE: NEGATIVE
POC (TCA) TRICYCLIC ANTIDEPRESSANTS: NEGATIVE
POC TEMPERATURE (URINE): 92
POC THC: NEGATIVE

## 2022-08-15 PROCEDURE — 99215 OFFICE O/P EST HI 40 MIN: CPT | Mod: PBBFAC | Performed by: PAIN MEDICINE

## 2022-08-15 PROCEDURE — 99214 PR OFFICE/OUTPT VISIT, EST, LEVL IV, 30-39 MIN: ICD-10-PCS | Mod: S$PBB,,, | Performed by: PAIN MEDICINE

## 2022-08-15 PROCEDURE — 99214 OFFICE O/P EST MOD 30 MIN: CPT | Mod: S$PBB,,, | Performed by: PAIN MEDICINE

## 2022-08-15 PROCEDURE — 80305 DRUG TEST PRSMV DIR OPT OBS: CPT | Mod: PBBFAC | Performed by: PAIN MEDICINE

## 2022-08-15 RX ORDER — PANTOPRAZOLE SODIUM 40 MG/1
40 TABLET, DELAYED RELEASE ORAL DAILY
COMMUNITY
Start: 2022-07-18

## 2022-08-15 RX ORDER — GABAPENTIN 100 MG/1
100 CAPSULE ORAL 2 TIMES DAILY
COMMUNITY
Start: 2022-07-18 | End: 2023-12-12

## 2022-08-15 RX ORDER — AMITRIPTYLINE HYDROCHLORIDE 25 MG/1
25 TABLET, FILM COATED ORAL NIGHTLY PRN
Qty: 30 TABLET | Refills: 0 | Status: SHIPPED | OUTPATIENT
Start: 2022-08-15 | End: 2022-09-20

## 2022-08-15 RX ORDER — ACETAMINOPHEN AND CODEINE PHOSPHATE 300; 30 MG/1; MG/1
1 TABLET ORAL EVERY 4 HOURS PRN
Qty: 120 TABLET | Refills: 0 | Status: SHIPPED | OUTPATIENT
Start: 2022-08-27 | End: 2022-09-20

## 2022-08-15 RX ORDER — CYCLOBENZAPRINE HCL 10 MG
10 TABLET ORAL EVERY 8 HOURS PRN
Qty: 90 TABLET | Refills: 2 | Status: SHIPPED | OUTPATIENT
Start: 2022-08-15 | End: 2022-09-20

## 2022-08-15 RX ORDER — MELOXICAM 7.5 MG/1
7.5 TABLET ORAL DAILY
COMMUNITY
Start: 2022-05-11

## 2022-08-15 NOTE — PATIENT INSTRUCTIONS
BILATERAL L3-S1 RFTC  BOTH SIDES SAME DAY   9-6-2022 AT 0820      ALL PATIENTS TO HAVE COVID TESTING TO BE DONE 48-72 HOURS PRIOR TO PROCEDURE IF PT HAS NOT RECEIVED BOTH COVID VACCINATIONS OR HAS BEEN VACCINATED WITHIN THE LAST 2 WEEKS.     IF PATIENT HAD BOTH VACCINES AT GREATER THAN  TWO WEEKS PRIOR TO PROCEDURE , PT DOES NOT HAVE TO HAVE COVID TESTING, VACCINATION CARD MUST BE PROVIDED  OR   PT MUST HAVE COVID TESTING IN ORDER TO HAVE PROCEDURE.     If you have not had the covid vaccine and have tested positive in a clinical setting 60 days prior to procedure, you do not have to be tested for covid.    IF YOUR  PROCEDURE IS ON A Tuesday, GET COVID TESTING ON THE  Friday BEFORE PROCEDURE.    IF YOUR PROCEDURE IS ON Thursday, HAVE COVID TESTING ON THE Monday OR Tuesday PRIOR TO PROCEDURE    COVID TESTING TO BE DONE AT [facility] IN THE LAB DEPARTMENT OR YOUR PRIMARY CARE DOCTOR MAY ORDER IT FOR YOU.IF YOUR PRIMARY  CARE DOCTOR ORDERS YOUR COVID TESTING YOU MUST BRING YOUR RESULTS WITH YOU TO YOUR PROCEDURE.     HOME COVID TESTING ARE NOT ALLOWED TO BE USED FOR TESTING FOR PROCEDURE.      Procedure Instructions:    Nothing to eat or drink for 8 hours or after midnight including gum, candy, mints, or tobacco products.  If you are scheduled for 1:30 or later nothing to eat or drink after 5 a.m. the morning of the procedure, including gum, candy, mints, or tobacco products.  Must have a  at least 18 yrs of age to stay present at all times  No Diabetic medications the morning of procedure, check blood sugar the morning of procedure, if it is greater than 200 call the office at [phone]  If you are started on antibiotics or have been prescribed antibiotics, have a fever, or have any other type of infection call to reschedule procedure.  If you take blood pressure medications you can take it at your regular scheduled time with a small sip of WATER!    HOLD ASPIRIN AND ASPIRIN PRODUCTS  (ASPIRIN, BC  POWDER ETC. ) FOR 7 DAYS  PRIOR TO PROCEDURE  HOLD NSAIDS( ibuprofen, mobic, meloxicam, advil, diclofenac, naproxen, relafen, celebrex,  methotrexate, aleve etc....)  FOR 3 DAYS   PRIOR TO PROCEDURE

## 2022-08-16 NOTE — PROGRESS NOTES
She Disclaimer: This note has been generated using voice-recognition software. There may be typographical errors that have been missed during proof-reading        Patient ID: Martina Guerra is a 76 y.o. female.      Chief Complaint: Neck Pain and Back Pain      76-year-old female returns for re-evaluation of intractable lower back, cervical and bilateral shoulder pain.  She received bilateral lumbar medial branch block #1 06/22/2021 and experienced 100% pain relief for several days.  She did not receive the 2nd diagnostic block and continued with medication management.  Over the past several months,  her pain has progressively worsened.  She denies a radicular component to the lower extremities.  She has noticed significant weight gain with Elavil for nighttime insomnia and desires to wean medication..  She returns today to discuss repeating medial branch blocks for lower back pain and spondylosis.              Pain Assessment  Pain Score:   5  Pain Location: Other (Comment) (neck and back)  Pain Descriptors: Aching, Constant, Dull, Sharp  Pain Frequency: Continuous  Pain Onset: Awakened from sleep  Clinical Progression: Not changed  Aggravating Factors: Bending, Standing, Walking  Pain Intervention(s): Heat applied, Medication (See eMAR), Rest      A's of Opioid Risk Assessment  Activity:Patient can not perform ADL.   Analgesia:Patients pain is not controlled by current medication.   Adverse Effects: Patient denies constipation or sedation.  Aberrant Behavior:  reviewed with no aberrant drug seeking/taking behavior.      Patient denies any suicidal or homicidal ideations    Physical Therapy/Home Exercise: yes      [place] [facility] for Pain Management  See OP Notes for results.     IMPRESSION: See OP Notes for results.     This procedure was auto-finalized by: Virtual Radiologist      Review of Systems   Constitutional: Negative.    HENT: Negative.    Eyes: Negative.    Respiratory: Negative.    Cardiovascular:  Negative.    Gastrointestinal: Negative.    Endocrine: Negative.    Genitourinary: Negative.    Musculoskeletal: Positive for arthralgias, back pain and neck pain.   Integumentary:  Negative.   Neurological: Negative.    Hematological: Negative.    Psychiatric/Behavioral: Negative.              Past Medical History:   Diagnosis Date    Asthma     Cervical radiculopathy     Chronic back pain     Chronic pain syndrome     Depressive disorder     Disorder of sacrum     Dyslipidemia     Enlarged liver     Essential (primary) hypertension     Glycosuria     Hypokalemia     Lumbar radiculopathy     Migraine     Mild chronic obstructive pulmonary disease     Neck pain     Osteoarthritis of multiple joints      Past Surgical History:   Procedure Laterality Date    ADENOIDECTOMY       SECTION      CHOLECYSTECTOMY      HYSTERECTOMY      INJECTION OF ANESTHETIC AGENT AROUND MEDIAL BRANCH NERVES INNERVATING LUMBAR FACET JOINT Bilateral 2021    Procedure: Block-nerve-medial branch-lumbar, bilateral L3 through S1;  Surgeon: Collette Chaney MD;  Location: [facility];  Service: Pain Management;  Laterality: Bilateral;    INJECTION OF FACET JOINT Bilateral 2019    Bilateral L3-S1 Facet Injection x3    KNEE SURGERY Right 2019    NECK SURGERY  2005    ORIF SUPRACONDYLAR FEMUR Right 2019    RADIOFREQUENCY THERMOCOAGULATION Left 10/09/2019    Left L3-S1 RFTC    RADIOFREQUENCY THERMOCOAGULATION Right 10/23/2019    Right L3-S1 RFTC    TONSILLECTOMY      TOTAL KNEE ARTHROPLASTY Bilateral      Social History     Socioeconomic History    Marital status:    Tobacco Use    Smoking status: Never Smoker    Smokeless tobacco: Never Used   Substance and Sexual Activity    Alcohol use: Never    Drug use: Yes     Types: Hydrocodone     Family History   Problem Relation Age of Onset    Stroke Mother     Hypertension Mother     Stroke Maternal Aunt     Abnormal EKG Paternal  "Uncle     Alcohol abuse Paternal Uncle     Stroke Maternal Grandmother      Review of patient's allergies indicates:   Allergen Reactions    Biaxin [clarithromycin] Other (See Comments)     Stomach pain    Celebrex [celecoxib] Other (See Comments)     "Picking and cellulitis"    Opioids - morphine analogues Other (See Comments)     Morphine Makes me loopy     Zyrtec [cetirizine] Other (See Comments)     Stomach Ache     has a current medication list which includes the following prescription(s): acetaminophen-codeine 300-30mg, amitriptyline, duloxetine, gabapentin, ipratropium-albuterol, meloxicam, montelukast, ondansetron, pantoprazole, potassium chloride sa, primidone, venlafaxine, [START ON 8/27/2022] acetaminophen-codeine 300-30mg, amitriptyline, and cyclobenzaprine.      Objective:  Vitals:    08/15/22 1259   BP: (!) 160/86   Pulse: 92        Physical Exam  Vitals and nursing note reviewed.   Constitutional:       General: She is not in acute distress.     Appearance: Normal appearance. She is not ill-appearing, toxic-appearing or diaphoretic.   HENT:      Head: Normocephalic and atraumatic.      Nose: Nose normal.      Mouth/Throat:      Mouth: Mucous membranes are moist.   Eyes:      Extraocular Movements: Extraocular movements intact.      Pupils: Pupils are equal, round, and reactive to light.   Cardiovascular:      Rate and Rhythm: Normal rate and regular rhythm.      Heart sounds: Normal heart sounds.   Pulmonary:      Effort: Pulmonary effort is normal. No respiratory distress.      Breath sounds: Normal breath sounds. No stridor. No wheezing or rhonchi.   Abdominal:      General: Bowel sounds are normal.      Palpations: Abdomen is soft.   Musculoskeletal:         General: No swelling or deformity.      Cervical back: Normal and normal range of motion. No spasms or tenderness. No pain with movement. Normal range of motion.      Thoracic back: Normal.      Lumbar back: Tenderness and bony tenderness " present. No spasms. Decreased range of motion. Negative right straight leg raise test and negative left straight leg raise test. No scoliosis.      Right lower leg: No edema.      Left lower leg: No edema.      Comments: Pain with flexion, extension and lateral rotation.  Bilateral facet tenderness to palpation from L3-S1   Skin:     General: Skin is warm.   Neurological:      General: No focal deficit present.      Mental Status: She is alert and oriented to person, place, and time. Mental status is at baseline.      Cranial Nerves: No cranial nerve deficit.      Sensory: Sensation is intact. No sensory deficit.      Motor: No weakness.      Coordination: Coordination normal.      Gait: Gait normal.      Deep Tendon Reflexes: Reflexes are normal and symmetric.   Psychiatric:         Mood and Affect: Mood normal.         Behavior: Behavior normal.           Assessment:      1. Encounter for long-term (current) use of other medications    2. Thoracic spondylosis    3. Lumbosacral spondylosis without myelopathy    4. Disorder of sacrum          Plan:  1. reviewed  2.Addiction, Dependency, Tolerance, Opioid abuse-misuse, Death, Diversion Discussed. Overdose reversal drug Naloxone discussed.  3.Refill/Continue medications for pain control and function       Requested Prescriptions     Signed Prescriptions Disp Refills    cyclobenzaprine (FLEXERIL) 10 MG tablet 90 tablet 2     Sig: Take 1 tablet (10 mg total) by mouth every 8 (eight) hours as needed for Muscle spasms.    acetaminophen-codeine 300-30mg (TYLENOL #3) 300-30 mg Tab 120 tablet 0     Sig: Take 1 tablet by mouth every 4 (four) hours as needed.    amitriptyline (ELAVIL) 25 MG tablet 30 tablet 0     Sig: Take 1 tablet (25 mg total) by mouth nightly as needed for Insomnia.     4.Urine drug screen point of care obtained and consistent with prescribed medications and medication refill date    5.Indication: The patient has clinical and radiologic findings  suggestive of facet mediated pain and is s/p 2nd diagnostic bilateral lumbar L3/4, L4/5 and L5/S1 medial branch blocks with at least 80% relief of their axial back pain, over 50% consistent improvement their ability to perform previously painful movements and ADL's lasting the duration of the local anesthetic utilized.    Orders Placed This Encounter   Procedures    POCT Urine Drug Screen Presump     Interpretive Information:     Negative:  No drug detected at the cut off level.   Positive:  This result represents presumptive positive for the   tested drug, other substances may yield a positive response other   than the analyte of interest. This result should be utilized for   diagnostic purpose only. Confirmation testing will be performed upon physician request only.       Case Request Operating Room: Bilateral L3-4,4-5,5-S1 MB RFTC ( same day)     Order Specific Question:   Medical Necessity:     Answer:   Medically Non-Urgent [100]     Order Specific Question:   CPT Code:     Answer:   NM DESTROY LUMB/SAC FACET JNT [32749]     Order Specific Question:   Positioning:     Answer:   Prone [1003]     Order Specific Question:   Post-Procedure Disposition:     Answer:   PACU [1]     Order Specific Question:   Estimated Length of Stay:     Answer:   0 midnight     Order Specific Question:   Implant Required:     Answer:   No [1001]     Order Specific Question:   Is an on-site pathologist required for this procedure?     Answer:   N/A      6.Indications for this procedure for this specific patient include the following   - Pt has had symptoms for three months with moderate to severe pain with functional impairment rated of 7/10 pain.   - Pain non-responsive to conservative care.    - Pain predominately axial and not associated with radiculopathy or claudication.    - No non-facet pathology as source of pain.    - Clinical assessment implicates facet joint as putative pain source.    - Pain is exacerbated by extension  or prolonged sitting/standing and relieved by rest.    - No unexplained neurologic deficit.    - No history of coagulopathy, infection or unstable medical conditions.    - Pain is causing significant functional limitation resulting in diminished quality of life and impaired age appropriate ADL's.   - Clinical assessment implicates facet joint as putative source of pain  - Repeat injections not done prior to 7 days   - no more than 2 levels will be done per side      7.Monitored Anesthesia Care medical necessity authorization request:    Monitor anesthesia request is medically indicated for the scheduled nerve block procedure due to:  - needle phobia and anxiety, placing  the patient at risk during the provided service.  -patient has severe sleep apnea for which BiPAP and oxygen are needed while sleeping  -patient has an ASA class greater than 3 and requires constant presence of an anesthesiologist during the procedure:  -patient has severe problems with muscles and muscle spasticity that makes it hard to lie still  -patient suffers from chronic pain and is unable to function due to  diminished ADLs    8.The planned medically necessary  surgical procedure is performed in a hospital outpatient department and not in an ambulatory surgical center due to:     -there is no geographically assessable ambulatory surgery center that has the  necessary equipment and fluoroscopy needed for the procedure     -there is no geographically assessable ambulatory surgical center available at which the physician has privileges     -an ASC's  specific  guideline regarding the individuals weight or health conditions that prevent the use of an ASC           report:  Reviewed and consistent with medication use as prescribed.      The total time spent for evaluation and management on 08/16/2022 including reviewing separately obtained history, performing a medically appropriate exam and evaluation, documenting clinical information in the  health record, independently interpreting results and communicating them to the patient/family/caregiver, and ordering medications/tests/procedures was between 15-29 minutes.    The above plan and management options were discussed at length with patient. Patient is in agreement with the above and verbalized understanding. It will be communicated with the referring physician via electronic record, fax, or mail.

## 2022-08-17 ENCOUNTER — TELEPHONE (OUTPATIENT)
Dept: PAIN MEDICINE | Facility: CLINIC | Age: 77
End: 2022-08-17
Payer: MEDICARE

## 2022-08-17 NOTE — TELEPHONE ENCOUNTER
----- Message from Marcia Zarate sent at 8/17/2022  9:14 AM CDT -----  Regarding: cancel procedure  Cancel procedure for 9/6

## 2022-08-17 NOTE — TELEPHONE ENCOUNTER
Pt is scheduled for Bilateral L3-S1 RFTC on 9-6-2022.  Pt was contacted whom states she does not wish to have procedure at this time. Pt states she had already called the  and got a followup with D Shows for medication refill. Pt was taken off the schedule.

## 2022-09-20 NOTE — PROGRESS NOTES
Subjective:         Patient ID: Martina Guerra is a 76 y.o. female.    Chief Complaint: Neck Pain, Shoulder Pain (bilateral), and Low-back Pain      Pain  This is a chronic problem. The current episode started more than 1 year ago. The problem occurs daily. The problem has been waxing and waning. Associated symptoms include arthralgias and neck pain. Pertinent negatives include no abdominal pain, change in bowel habit, chest pain, chills, coughing, diaphoresis, fever, rash, sore throat, vertigo or vomiting.   Review of Systems   Constitutional:  Negative for activity change, appetite change, chills, diaphoresis, fever and unexpected weight change.   HENT:  Negative for drooling, ear discharge, ear pain, facial swelling, nosebleeds, sore throat, trouble swallowing, voice change and goiter.    Eyes:  Negative for photophobia, pain, discharge, redness and visual disturbance.   Respiratory:  Negative for apnea, cough, choking, chest tightness, shortness of breath, wheezing and stridor.    Cardiovascular:  Negative for chest pain, palpitations and leg swelling.   Gastrointestinal:  Negative for abdominal distention, abdominal pain, change in bowel habit, diarrhea, rectal pain, vomiting, fecal incontinence and change in bowel habit.   Endocrine: Negative for cold intolerance, heat intolerance, polydipsia, polyphagia and polyuria.   Genitourinary:  Negative for bladder incontinence, dysuria, flank pain, frequency and hot flashes.   Musculoskeletal:  Positive for arthralgias, back pain, leg pain, neck pain and neck stiffness.   Integumentary:  Negative for color change, pallor and rash.   Allergic/Immunologic: Negative for immunocompromised state.   Neurological:  Negative for dizziness, vertigo, seizures, syncope, facial asymmetry, speech difficulty, light-headedness, coordination difficulties, memory loss and coordination difficulties.   Hematological:  Negative for adenopathy. Does not bruise/bleed easily.    Psychiatric/Behavioral:  Negative for agitation, behavioral problems, confusion, decreased concentration, dysphoric mood, hallucinations, self-injury and suicidal ideas. The patient is not nervous/anxious and is not hyperactive.          Past Medical History:   Diagnosis Date    Asthma     Cervical radiculopathy     Chronic back pain     Chronic pain syndrome     Depressive disorder     Disorder of sacrum     Dyslipidemia     Enlarged liver     Essential (primary) hypertension     Glycosuria     Hypokalemia     Lumbar radiculopathy     Migraine     Mild chronic obstructive pulmonary disease     Neck pain     Osteoarthritis of multiple joints      Past Surgical History:   Procedure Laterality Date    ADENOIDECTOMY       SECTION      CHOLECYSTECTOMY      HYSTERECTOMY      INJECTION OF ANESTHETIC AGENT AROUND MEDIAL BRANCH NERVES INNERVATING LUMBAR FACET JOINT Bilateral 2021    Procedure: Block-nerve-medial branch-lumbar, bilateral L3 through S1;  Surgeon: Collette Chaney MD;  Location: [facility] [place];  Service: Pain Management;  Laterality: Bilateral;    INJECTION OF FACET JOINT Bilateral 2019    Bilateral L3-S1 Facet Injection x3    KNEE SURGERY Right 2019    NECK SURGERY  2005    ORIF SUPRACONDYLAR FEMUR Right 2019    RADIOFREQUENCY THERMOCOAGULATION Left 10/09/2019    Left L3-S1 RFTC    RADIOFREQUENCY THERMOCOAGULATION Right 10/23/2019    Right L3-S1 RFTC    TONSILLECTOMY      TOTAL KNEE ARTHROPLASTY Bilateral      Social History     Socioeconomic History    Marital status:    Tobacco Use    Smoking status: Never    Smokeless tobacco: Never   Substance and Sexual Activity    Alcohol use: Never    Drug use: Yes     Types: Hydrocodone     Family History   Problem Relation Age of Onset    Stroke Mother     Hypertension Mother     Stroke Maternal Aunt     Abnormal EKG Paternal Uncle     Alcohol abuse Paternal Uncle     Stroke Maternal Grandmother      Review of patient's allergies  "indicates:   Allergen Reactions    Biaxin [clarithromycin] Other (See Comments)     Stomach pain    Celebrex [celecoxib] Other (See Comments)     "Picking and cellulitis"    Opioids - morphine analogues Other (See Comments)     Morphine Makes me loopy     Zyrtec [cetirizine] Other (See Comments)     Stomach Ache        Objective:  Vitals:    09/21/22 1322   BP: (!) 152/68   Pulse: 104   Resp: 18   Weight: 67.6 kg (149 lb)   Height: 5' 1" (1.549 m)   PainSc:   6         Physical Exam  Vitals and nursing note reviewed. Exam conducted with a chaperone present.   Constitutional:       General: She is awake. She is not in acute distress.     Appearance: Normal appearance. She is not toxic-appearing or diaphoretic.   HENT:      Head: Normocephalic and atraumatic.      Nose: Nose normal.      Mouth/Throat:      Mouth: Mucous membranes are moist.      Pharynx: Oropharynx is clear.   Eyes:      Conjunctiva/sclera: Conjunctivae normal.      Pupils: Pupils are equal, round, and reactive to light.   Cardiovascular:      Rate and Rhythm: Normal rate.   Pulmonary:      Effort: Pulmonary effort is normal. No respiratory distress.   Abdominal:      Palpations: Abdomen is soft.   Musculoskeletal:         General: Normal range of motion.      Cervical back: Normal range of motion and neck supple. Tenderness present.      Lumbar back: Tenderness present.   Skin:     General: Skin is warm and dry.      Coloration: Skin is not jaundiced or pale.   Neurological:      General: No focal deficit present.      Mental Status: She is alert and oriented to person, place, and time. Mental status is at baseline.      Cranial Nerves: No cranial nerve deficit (II-XII).   Psychiatric:         Mood and Affect: Mood normal.         Behavior: Behavior normal. Behavior is cooperative.         Thought Content: Thought content normal.         [facility] Fluoro for Pain Management  See OP Notes for results.     IMPRESSION: See OP Notes for results.     This " procedure was auto-finalized by: Virtual Radiologist       Office Visit on 08/15/2022   Component Date Value Ref Range Status    POC Amphetamines 08/15/2022 Negative  Negative, Inconclusive Final    POC Barbiturates 08/15/2022 Negative  Negative, Inconclusive Final    POC Benzodiazepines 08/15/2022 Negative  Negative, Inconclusive Final    POC Cocaine 08/15/2022 Negative  Negative, Inconclusive Final    POC THC 08/15/2022 Negative  Negative, Inconclusive Final    POC Methadone 08/15/2022 Negative  Negative, Inconclusive Final    POC Methamphetamine 08/15/2022 Negative  Negative, Inconclusive Final    POC Opiates 08/15/2022 Presumptive Positive (A)  Negative, Inconclusive Final    POC Oxycodone 08/15/2022 Negative  Negative, Inconclusive Final    POC Phencyclidine 08/15/2022 Negative  Negative, Inconclusive Final    POC Methylenedioxymethamphetamine * 08/15/2022 Negative  Negative, Inconclusive Final    POC Tricyclic Antidepressants 08/15/2022 Negative  Negative, Inconclusive Final    POC Buprenorphine 08/15/2022 Negative   Final     Acceptable 08/15/2022 Yes   Final    POC Temperature (Urine) 08/15/2022 92   Final   Office Visit on 05/02/2022   Component Date Value Ref Range Status    POC Amphetamines 05/02/2022 Negative  Negative, Inconclusive Final    POC Barbiturates 05/02/2022 Presumptive Positive (A)  Negative, Inconclusive Final    POC Benzodiazepines 05/02/2022 Negative  Negative, Inconclusive Final    POC Cocaine 05/02/2022 Negative  Negative, Inconclusive Final    POC THC 05/02/2022 Negative  Negative, Inconclusive Final    POC Methadone 05/02/2022 Negative  Negative, Inconclusive Final    POC Methamphetamine 05/02/2022 Negative  Negative, Inconclusive Final    POC Opiates 05/02/2022 Negative  Negative, Inconclusive Final    POC Oxycodone 05/02/2022 Negative  Negative, Inconclusive Final    POC Phencyclidine 05/02/2022 Negative  Negative, Inconclusive Final    POC  Methylenedioxymethamphetamine * 05/02/2022 Negative  Negative, Inconclusive Final    POC Tricyclic Antidepressants 05/02/2022 Negative  Negative, Inconclusive Final    POC Buprenorphine 05/02/2022 Negative   Final     Acceptable 05/02/2022 Yes   Final    POC Temperature (Urine) 05/02/2022 94   Final    pH, UA 05/02/2022 6.0  5.0, 5.5, 6.0, 6.5, 7.0, 7.5, 8.0 pH Units Final    Specific Gravity, UA 05/02/2022 1.020  <=1.005, 1.010, 1.015, 1.020, 1.025, 1.030 Final    Creatinine, Urine 05/02/2022 61  28 - 219 mg/dL Final    6-Acetylmorphine 05/02/2022 Negative  10 ng/mL Final    7-Aminoclonazepam 05/02/2022 Negative  Negative 25 ng/mL Final    a-Hydroxyalprazolam 05/02/2022 Negative  25 ng/mL Final    Acetyl Fentanyl 05/02/2022 Negative  2.5 ng/mL Final    Acetyl Norfentanyl Oxalate 05/02/2022 Negative  5 ng/mL Final    Benzoylecgonine 05/02/2022 Negative  100 ng/mL Final    Buprenorphine 05/02/2022 Negative  25 ng/mL Final    Codeine 05/02/2022 Negative  25 ng/mL Final    EDDP 05/02/2022 Negative  25 ng/mL Final    Fentanyl 05/02/2022 Negative  2.5 ng/mL Final    Hydrocodone 05/02/2022 Negative  25 ng/mL Final    Hydromorphone 05/02/2022 Negative  25 ng/mL Final    Lorazepam 05/02/2022 Negative  25 ng/mL Final    Morphine 05/02/2022 Negative  25 ng/mL Final    Norbuprenorphine 05/02/2022 Negative  25 ng/mL Final    Nordiazepam 05/02/2022 Negative  25 ng/mL Final    Norfentanyl Oxalate 05/02/2022 Negative  5 ng/mL Final    Norhydrocodone 05/02/2022 Negative  50 ng/mL Final    Noroxycodone HCL 05/02/2022 Negative  50 ng/mL Final    Oxazepam 05/02/2022 Negative  25 ng/mL Final    Oxymorphone 05/02/2022 Negative  25 ng/mL Final    Tapentadol 05/02/2022 Negative  25 ng/mL Final    Temazepam 05/02/2022 Negative  25 ng/mL Final    THC-COOH 05/02/2022 Negative  25 ng/mL Final    Tramadol 05/02/2022 Negative  100 ng/mL Final    Amphetamine, Urine 05/02/2022 Negative  Negative 100 ng/mL Final    Methamphetamines,  Urine 05/02/2022 Negative  Negative 100 ng/mL Final    Methadone, Urine 05/02/2022 Negative  Negative 25 ng/mL Final    Oxycodone, Urine 05/02/2022 Negative  Negative 25 ng/mL Final         Orders Placed This Encounter   Procedures    Case Request Operating Room: Radiofrequency Ablation, Nerve, Spinal, Lumbar, Medial Branch, Level L4-S1     Order Specific Question:   Medical Necessity:     Answer:   Medically Non-Urgent [100]     Order Specific Question:   CPT Code:     Answer:   NC DESTROY LUMB/SAC FACET JNT [64614]     Order Specific Question:   CPT Code:     Answer:   NC DESTROY L/S FACET JNT ADDL [01824]     Order Specific Question:   Is an on-site pathologist required for this procedure?     Answer:   N/A         Requested Prescriptions     Signed Prescriptions Disp Refills    cyclobenzaprine (FLEXERIL) 10 MG tablet 90 tablet 2     Sig: Take 1 tablet (10 mg total) by mouth every 8 (eight) hours as needed for Muscle spasms.    amitriptyline (ELAVIL) 50 MG tablet 30 tablet 2     Sig: Take 1 tablet (50 mg total) by mouth nightly as needed for Insomnia or Pain.    acetaminophen-codeine 300-30mg (TYLENOL #3) 300-30 mg Tab 120 tablet 0     Sig: Take 1 tablet by mouth every 6 (six) hours as needed (Pain).       Assessment:     1. Lumbosacral spondylosis without myelopathy    2. Thoracic spondylosis    3. Disorder of sacrum    4. Cervical radiculopathy         A's of Opioid Risk Assessment  Activity:Patient can perform ADL.   Analgesia:Patients pain is partially controlled by current medication. Patient has tried OTC medications such as Tylenol and Ibuprofen with out relief.   Adverse Effects: Patient denies constipation or sedation.  Aberrant Behavior:  reviewed with no aberrant drug seeking/taking behavior.  Overdose reversal drug naloxone discussed    Drug screen reviewed      Plan:    Definitive drug screen May 2, 2022 negative, was not using narcotics that time    Complaint of joint pain neck pain, back pain  requesting Toradol injection     Requesting to reschedule her lumbar procedure  discomfort ongoing more than 3 months worse with increasing activity flexion-extension lumbar spine, ongoing more than 3 months tenderness in the facet joint area pain is facet joint in nature    Complaint of cervical spine discomfort bilateral arm pain numbness and tingling radicular in nature     Needs to consider MRI cervical spine patient verbalized understanding    She denies loss of bowel or bladder function    Toradol 60 mg IM, tolerated well    Continue home exercise program as directed    Indications for this procedure for this specific patient include the following   - Pt has had symptoms for three months with moderate to severe pain with functional impairment rated of 7/10 pain.   - Pain non-responsive to conservative care.    - Pain predominately axial and not associated with radiculopathy or claudication.    - No non-facet pathology as source of pain.    - Clinical assessment implicates facet joint as putative pain source.    - Pain is exacerbated by extension or prolonged sitting/standing and relieved by rest.    - No unexplained neurologic deficit.    - No history of coagulopathy, infection or unstable medical conditions.    - Pain is causing significant functional limitation resulting in diminished quality of life and impaired age appropriate ADL's.   - Clinical assessment implicates facet joint as putative source of pain  - Repeat injections not done prior to 7 days   - no more than 2 levels will be done    The planned medically necessary  surgical procedure is performed in a hospital outpatient department and not in an ambulatory surgical center due to:     -there is no geographically assessable ambulatory surgery center that has the  necessary equipment and fluoroscopy needed for the procedure     -there is no geographically assessable ambulatory surgical center available at which the physician has privileges     -an  ASC's  specific  guideline regarding the individuals weight or health conditions that prevent the use of an ASC    Monitor anesthesia request is medically indicated for the scheduled nerve block procedure due to:  1- needle phobia and anxiety, placing  the patient at risk during the provided service.  2-patient has an ASA class greater than 3 and requires constant presence of an anesthesiologist during the procedure,   3-patient has severe problems hard to lie still  4-patient suffers from chronic pain and is unable to function due to  diminished ADLs    Schedule bilateral lumbar RF TC L4 through S1, lumbosacral spondylosis    Dr. Chaney    Bring original prescription medication bottles/container/box with labels to each visit    Pill count    Physical therapy    Massage therapy declines

## 2022-09-21 ENCOUNTER — OFFICE VISIT (OUTPATIENT)
Dept: PAIN MEDICINE | Facility: CLINIC | Age: 77
End: 2022-09-21
Payer: MEDICARE

## 2022-09-21 VITALS
WEIGHT: 149 LBS | SYSTOLIC BLOOD PRESSURE: 152 MMHG | HEIGHT: 61 IN | RESPIRATION RATE: 18 BRPM | DIASTOLIC BLOOD PRESSURE: 68 MMHG | HEART RATE: 104 BPM | BODY MASS INDEX: 28.13 KG/M2

## 2022-09-21 DIAGNOSIS — M47.814 THORACIC SPONDYLOSIS: Chronic | ICD-10-CM

## 2022-09-21 DIAGNOSIS — M54.12 CERVICAL RADICULOPATHY: Chronic | ICD-10-CM

## 2022-09-21 DIAGNOSIS — M53.3 DISORDER OF SACRUM: Chronic | ICD-10-CM

## 2022-09-21 DIAGNOSIS — M47.817 LUMBOSACRAL SPONDYLOSIS WITHOUT MYELOPATHY: Primary | Chronic | ICD-10-CM

## 2022-09-21 PROCEDURE — 99215 OFFICE O/P EST HI 40 MIN: CPT | Mod: PBBFAC | Performed by: PHYSICIAN ASSISTANT

## 2022-09-21 PROCEDURE — 96372 THER/PROPH/DIAG INJ SC/IM: CPT | Mod: PBBFAC | Performed by: PHYSICIAN ASSISTANT

## 2022-09-21 PROCEDURE — 99214 PR OFFICE/OUTPT VISIT, EST, LEVL IV, 30-39 MIN: ICD-10-PCS | Mod: S$PBB,25,, | Performed by: PHYSICIAN ASSISTANT

## 2022-09-21 PROCEDURE — 99214 OFFICE O/P EST MOD 30 MIN: CPT | Mod: S$PBB,25,, | Performed by: PHYSICIAN ASSISTANT

## 2022-09-21 RX ORDER — CYCLOBENZAPRINE HCL 10 MG
10 TABLET ORAL EVERY 8 HOURS PRN
Qty: 90 TABLET | Refills: 2 | Status: SHIPPED | OUTPATIENT
Start: 2022-09-21 | End: 2022-10-26 | Stop reason: SDUPTHER

## 2022-09-21 RX ORDER — AMITRIPTYLINE HYDROCHLORIDE 50 MG/1
50 TABLET, FILM COATED ORAL NIGHTLY PRN
Qty: 30 TABLET | Refills: 2 | Status: SHIPPED | OUTPATIENT
Start: 2022-09-21 | End: 2022-10-26 | Stop reason: SDUPTHER

## 2022-09-21 RX ORDER — KETOROLAC TROMETHAMINE 30 MG/ML
60 INJECTION, SOLUTION INTRAMUSCULAR; INTRAVENOUS
Status: COMPLETED | OUTPATIENT
Start: 2022-09-21 | End: 2022-09-21

## 2022-09-21 RX ORDER — ACETAMINOPHEN AND CODEINE PHOSPHATE 300; 30 MG/1; MG/1
1 TABLET ORAL EVERY 6 HOURS PRN
Qty: 120 TABLET | Refills: 0 | Status: SHIPPED | OUTPATIENT
Start: 2022-09-28 | End: 2022-10-26 | Stop reason: SDUPTHER

## 2022-09-21 RX ADMIN — KETOROLAC TROMETHAMINE 60 MG: 60 INJECTION, SOLUTION INTRAMUSCULAR at 02:09

## 2022-09-21 NOTE — PATIENT INSTRUCTIONS
COVID SCREENING TO BE DONE 48-72 HOURS PRIOR TO PROCEDURE IF PT HAS NOT RECEIVED BOTH COVID VACCINATIONS OR HAS BEEN VACCINATED WITHIN THE LAST 2 WEEKS. VACCINATION CARD MUST BE PROVIDED IF PT HAS BEEN VACCINATED OR PT MUST HAVE COVID SCREENING IN ORDER TO HAVE PROCEDURE.  IF YOUR PROCEDURE IS ON A Tuesday, GET COVID TESTING ON THE Friday PRIOR TO PROCEDURE.  IF YOUR PROCEDURE IS ON A Thursday GET COVID TESTING ON THE Monday OR Tuesday PRIOR TO PROCEDURE.    IF YOUR PRIMARY CARE DOCTOR ORDERS YOUR COVID TESTING YOU MUST BRING YOUR RESULTS WITH YOU TO YOUR PROCEDURE.    Procedure Instructions:    Nothing to eat or drink for 8 hours or after midnight including gum, candy, mints, or tobacco products.  If you are scheduled for 1:30 or later nothing to eat or drink after 5 a.m. the morning of the procedure, including gum, candy, mints, or tobacco products.  Must have a  at least 18 yrs of age to stay present at all times  No Diabetic medications the morning of procedure, check blood sugar the morning of procedure, if it is greater than 200 call the office at [phone]  If you are started on antibiotics or have been prescribed antibiotics, have a fever, or have any other type of infection call to reschedule procedure.  If you take blood pressure medications you can take it at your regular scheduled time.        HOLD ASPIRIN AND ASPIRIN PRODUCTS  (ASPIRIN, BC POWDER ETC. ) FOR 7 DAYS  PRIOR TO PROCEDURE  HOLD NSAIDS( ibuprofen, mobic, meloxicam, advil, diclofenac, methotrexate, aleve etc....)  FOR 3 DAYS   PRIOR TO PROCEDURE  Stop mobic on 10/14

## 2022-10-19 ENCOUNTER — TELEPHONE (OUTPATIENT)
Dept: PAIN MEDICINE | Facility: CLINIC | Age: 77
End: 2022-10-19
Payer: MEDICARE

## 2022-10-19 NOTE — TELEPHONE ENCOUNTER
Pt was scheduled for Bilateral L4-S1 RFTC on 10-, in which pt was a no show. Pt was last seen on 9- by OVI Cristobal.

## 2022-10-26 ENCOUNTER — OFFICE VISIT (OUTPATIENT)
Dept: PAIN MEDICINE | Facility: CLINIC | Age: 77
End: 2022-10-26
Payer: MEDICARE

## 2022-10-26 VITALS
SYSTOLIC BLOOD PRESSURE: 145 MMHG | BODY MASS INDEX: 27.75 KG/M2 | HEART RATE: 116 BPM | DIASTOLIC BLOOD PRESSURE: 86 MMHG | RESPIRATION RATE: 19 BRPM | WEIGHT: 147 LBS | HEIGHT: 61 IN

## 2022-10-26 DIAGNOSIS — M47.814 THORACIC SPONDYLOSIS: Chronic | ICD-10-CM

## 2022-10-26 DIAGNOSIS — M47.817 LUMBOSACRAL SPONDYLOSIS WITHOUT MYELOPATHY: Primary | Chronic | ICD-10-CM

## 2022-10-26 DIAGNOSIS — M54.12 CERVICAL RADICULOPATHY: Chronic | ICD-10-CM

## 2022-10-26 DIAGNOSIS — M53.3 DISORDER OF SACRUM: Chronic | ICD-10-CM

## 2022-10-26 PROCEDURE — 99214 PR OFFICE/OUTPT VISIT, EST, LEVL IV, 30-39 MIN: ICD-10-PCS | Mod: S$PBB,25,, | Performed by: PHYSICIAN ASSISTANT

## 2022-10-26 PROCEDURE — 96372 THER/PROPH/DIAG INJ SC/IM: CPT | Mod: PBBFAC | Performed by: PHYSICIAN ASSISTANT

## 2022-10-26 PROCEDURE — 99214 OFFICE O/P EST MOD 30 MIN: CPT | Mod: S$PBB,25,, | Performed by: PHYSICIAN ASSISTANT

## 2022-10-26 PROCEDURE — 99214 OFFICE O/P EST MOD 30 MIN: CPT | Mod: PBBFAC | Performed by: PHYSICIAN ASSISTANT

## 2022-10-26 RX ORDER — ACETAMINOPHEN AND CODEINE PHOSPHATE 300; 30 MG/1; MG/1
1 TABLET ORAL EVERY 6 HOURS PRN
Qty: 120 TABLET | Refills: 0 | Status: SHIPPED | OUTPATIENT
Start: 2022-10-28 | End: 2022-10-26

## 2022-10-26 RX ORDER — KETOROLAC TROMETHAMINE 30 MG/ML
60 INJECTION, SOLUTION INTRAMUSCULAR; INTRAVENOUS
Status: COMPLETED | OUTPATIENT
Start: 2022-10-26 | End: 2022-10-26

## 2022-10-26 RX ORDER — AMITRIPTYLINE HYDROCHLORIDE 50 MG/1
50 TABLET, FILM COATED ORAL NIGHTLY PRN
Qty: 30 TABLET | Refills: 2 | Status: SHIPPED | OUTPATIENT
Start: 2022-10-26 | End: 2023-03-07 | Stop reason: SDUPTHER

## 2022-10-26 RX ORDER — ACETAMINOPHEN AND CODEINE PHOSPHATE 300; 60 MG/1; MG/1
1 TABLET ORAL EVERY 6 HOURS PRN
Qty: 120 TABLET | Refills: 2 | Status: SHIPPED | OUTPATIENT
Start: 2022-10-28 | End: 2023-03-07 | Stop reason: SDUPTHER

## 2022-10-26 RX ORDER — TIZANIDINE HYDROCHLORIDE 4 MG/1
4 CAPSULE, GELATIN COATED ORAL EVERY 8 HOURS PRN
Qty: 90 CAPSULE | Refills: 2 | Status: SHIPPED | OUTPATIENT
Start: 2022-10-26 | End: 2023-03-07 | Stop reason: SDUPTHER

## 2022-10-26 RX ORDER — CYCLOBENZAPRINE HCL 10 MG
10 TABLET ORAL EVERY 8 HOURS PRN
Qty: 90 TABLET | Refills: 2 | Status: SHIPPED | OUTPATIENT
Start: 2022-10-26 | End: 2022-10-26

## 2022-10-26 RX ADMIN — KETOROLAC TROMETHAMINE 60 MG: 30 INJECTION, SOLUTION INTRAMUSCULAR at 01:10

## 2022-10-26 NOTE — PROGRESS NOTES
Subjective:         Patient ID: Martina Guerra is a 76 y.o. female.    Chief Complaint: Low-back Pain and Neck Pain      Pain  This is a chronic problem. The current episode started more than 1 year ago. The problem occurs daily. The problem has been unchanged. Associated symptoms include arthralgias and neck pain. Pertinent negatives include no anorexia, change in bowel habit, chest pain, chills, coughing, diaphoresis, fever, sore throat, vertigo or vomiting.   Review of Systems   Constitutional:  Negative for activity change, appetite change, chills, diaphoresis, fever and unexpected weight change.   HENT:  Negative for drooling, ear discharge, ear pain, facial swelling, nosebleeds, sore throat, trouble swallowing, voice change and goiter.    Eyes:  Negative for photophobia, pain, discharge, redness and visual disturbance.   Respiratory:  Negative for apnea, cough, choking, chest tightness, shortness of breath, wheezing and stridor.    Cardiovascular:  Negative for chest pain, palpitations and leg swelling.   Gastrointestinal:  Negative for abdominal distention, anorexia, change in bowel habit, diarrhea, rectal pain, vomiting, fecal incontinence and change in bowel habit.   Endocrine: Negative for cold intolerance, heat intolerance, polydipsia, polyphagia and polyuria.   Genitourinary:  Negative for bladder incontinence, dysuria, flank pain, frequency and hot flashes.   Musculoskeletal:  Positive for arthralgias, back pain, leg pain, neck pain and neck stiffness.   Integumentary:  Negative for color change and pallor.   Allergic/Immunologic: Negative for immunocompromised state.   Neurological:  Negative for dizziness, vertigo, seizures, syncope, facial asymmetry, speech difficulty, light-headedness, coordination difficulties, memory loss and coordination difficulties.   Hematological:  Negative for adenopathy. Does not bruise/bleed easily.   Psychiatric/Behavioral:  Negative for agitation, behavioral  problems, confusion, decreased concentration, dysphoric mood, hallucinations, self-injury and suicidal ideas. The patient is not nervous/anxious and is not hyperactive.          Past Medical History:   Diagnosis Date    Asthma     Cervical radiculopathy     Chronic back pain     Chronic pain syndrome     Depressive disorder     Disorder of sacrum     Dyslipidemia     Enlarged liver     Essential (primary) hypertension     Glycosuria     Hypokalemia     Lumbar radiculopathy     Migraine     Mild chronic obstructive pulmonary disease     Neck pain     Osteoarthritis of multiple joints      Past Surgical History:   Procedure Laterality Date    ADENOIDECTOMY       SECTION      CHOLECYSTECTOMY      HYSTERECTOMY      INJECTION OF ANESTHETIC AGENT AROUND MEDIAL BRANCH NERVES INNERVATING LUMBAR FACET JOINT Bilateral 2021    Procedure: Block-nerve-medial branch-lumbar, bilateral L3 through S1;  Surgeon: Collette Chaney MD;  Location: [facility];  Service: Pain Management;  Laterality: Bilateral;    INJECTION OF FACET JOINT Bilateral 2019    Bilateral L3-S1 Facet Injection x3    KNEE SURGERY Right 2019    NECK SURGERY  2005    ORIF SUPRACONDYLAR FEMUR Right 2019    RADIOFREQUENCY THERMOCOAGULATION Left 10/09/2019    Left L3-S1 RFTC    RADIOFREQUENCY THERMOCOAGULATION Right 10/23/2019    Right L3-S1 RFTC    TONSILLECTOMY      TOTAL KNEE ARTHROPLASTY Bilateral      Social History     Socioeconomic History    Marital status:    Tobacco Use    Smoking status: Never    Smokeless tobacco: Never   Substance and Sexual Activity    Alcohol use: Never    Drug use: Yes     Types: Hydrocodone     Family History   Problem Relation Age of Onset    Stroke Mother     Hypertension Mother     Stroke Maternal Aunt     Abnormal EKG Paternal Uncle     Alcohol abuse Paternal Uncle     Stroke Maternal Grandmother      Review of patient's allergies indicates:   Allergen Reactions    Biaxin [clarithromycin]  "Other (See Comments)     Stomach pain    Celebrex [celecoxib] Other (See Comments)     "Picking and cellulitis"    Opioids - morphine analogues Other (See Comments)     Morphine Makes me loopy     Zyrtec [cetirizine] Other (See Comments)     Stomach Ache        Objective:  Vitals:    10/26/22 1304   BP: (!) 145/86   Pulse: (!) 116   Resp: 19   Weight: 66.7 kg (147 lb)   Height: 5' 1" (1.549 m)   PainSc:   7         Physical Exam  Vitals and nursing note reviewed. Exam conducted with a chaperone present.   Constitutional:       General: She is awake. She is not in acute distress.     Appearance: Normal appearance. She is not toxic-appearing or diaphoretic.   HENT:      Head: Normocephalic and atraumatic.      Nose: Nose normal.      Mouth/Throat:      Mouth: Mucous membranes are moist.      Pharynx: Oropharynx is clear.   Eyes:      Conjunctiva/sclera: Conjunctivae normal.      Pupils: Pupils are equal, round, and reactive to light.   Cardiovascular:      Rate and Rhythm: Normal rate.   Pulmonary:      Effort: Pulmonary effort is normal. No respiratory distress.   Abdominal:      Palpations: Abdomen is soft.   Musculoskeletal:         General: Normal range of motion.      Cervical back: Normal range of motion and neck supple. Tenderness present.      Lumbar back: Tenderness present.   Skin:     General: Skin is warm and dry.      Coloration: Skin is not jaundiced or pale.   Neurological:      General: No focal deficit present.      Mental Status: She is alert and oriented to person, place, and time. Mental status is at baseline.      Cranial Nerves: No cranial nerve deficit (II-XII).   Psychiatric:         Mood and Affect: Mood normal.         Behavior: Behavior normal. Behavior is cooperative.         Thought Content: Thought content normal.         [facility] for Pain Management  See OP Notes for results.     IMPRESSION: See OP Notes for results.     This procedure was auto-finalized by: Virtual Radiologist     "   Office Visit on 08/15/2022   Component Date Value Ref Range Status    POC Amphetamines 08/15/2022 Negative  Negative, Inconclusive Final    POC Barbiturates 08/15/2022 Negative  Negative, Inconclusive Final    POC Benzodiazepines 08/15/2022 Negative  Negative, Inconclusive Final    POC Cocaine 08/15/2022 Negative  Negative, Inconclusive Final    POC THC 08/15/2022 Negative  Negative, Inconclusive Final    POC Methadone 08/15/2022 Negative  Negative, Inconclusive Final    POC Methamphetamine 08/15/2022 Negative  Negative, Inconclusive Final    POC Opiates 08/15/2022 Presumptive Positive (A)  Negative, Inconclusive Final    POC Oxycodone 08/15/2022 Negative  Negative, Inconclusive Final    POC Phencyclidine 08/15/2022 Negative  Negative, Inconclusive Final    POC Methylenedioxymethamphetamine * 08/15/2022 Negative  Negative, Inconclusive Final    POC Tricyclic Antidepressants 08/15/2022 Negative  Negative, Inconclusive Final    POC Buprenorphine 08/15/2022 Negative   Final     Acceptable 08/15/2022 Yes   Final    POC Temperature (Urine) 08/15/2022 92   Final   Office Visit on 05/02/2022   Component Date Value Ref Range Status    POC Amphetamines 05/02/2022 Negative  Negative, Inconclusive Final    POC Barbiturates 05/02/2022 Presumptive Positive (A)  Negative, Inconclusive Final    POC Benzodiazepines 05/02/2022 Negative  Negative, Inconclusive Final    POC Cocaine 05/02/2022 Negative  Negative, Inconclusive Final    POC THC 05/02/2022 Negative  Negative, Inconclusive Final    POC Methadone 05/02/2022 Negative  Negative, Inconclusive Final    POC Methamphetamine 05/02/2022 Negative  Negative, Inconclusive Final    POC Opiates 05/02/2022 Negative  Negative, Inconclusive Final    POC Oxycodone 05/02/2022 Negative  Negative, Inconclusive Final    POC Phencyclidine 05/02/2022 Negative  Negative, Inconclusive Final    POC Methylenedioxymethamphetamine * 05/02/2022 Negative  Negative, Inconclusive Final     POC Tricyclic Antidepressants 05/02/2022 Negative  Negative, Inconclusive Final    POC Buprenorphine 05/02/2022 Negative   Final     Acceptable 05/02/2022 Yes   Final    POC Temperature (Urine) 05/02/2022 94   Final    pH, UA 05/02/2022 6.0  5.0, 5.5, 6.0, 6.5, 7.0, 7.5, 8.0 pH Units Final    Specific Gravity, UA 05/02/2022 1.020  <=1.005, 1.010, 1.015, 1.020, 1.025, 1.030 Final    Creatinine, Urine 05/02/2022 61  28 - 219 mg/dL Final    6-Acetylmorphine 05/02/2022 Negative  10 ng/mL Final    7-Aminoclonazepam 05/02/2022 Negative  Negative 25 ng/mL Final    a-Hydroxyalprazolam 05/02/2022 Negative  25 ng/mL Final    Acetyl Fentanyl 05/02/2022 Negative  2.5 ng/mL Final    Acetyl Norfentanyl Oxalate 05/02/2022 Negative  5 ng/mL Final    Benzoylecgonine 05/02/2022 Negative  100 ng/mL Final    Buprenorphine 05/02/2022 Negative  25 ng/mL Final    Codeine 05/02/2022 Negative  25 ng/mL Final    EDDP 05/02/2022 Negative  25 ng/mL Final    Fentanyl 05/02/2022 Negative  2.5 ng/mL Final    Hydrocodone 05/02/2022 Negative  25 ng/mL Final    Hydromorphone 05/02/2022 Negative  25 ng/mL Final    Lorazepam 05/02/2022 Negative  25 ng/mL Final    Morphine 05/02/2022 Negative  25 ng/mL Final    Norbuprenorphine 05/02/2022 Negative  25 ng/mL Final    Nordiazepam 05/02/2022 Negative  25 ng/mL Final    Norfentanyl Oxalate 05/02/2022 Negative  5 ng/mL Final    Norhydrocodone 05/02/2022 Negative  50 ng/mL Final    Noroxycodone HCL 05/02/2022 Negative  50 ng/mL Final    Oxazepam 05/02/2022 Negative  25 ng/mL Final    Oxymorphone 05/02/2022 Negative  25 ng/mL Final    Tapentadol 05/02/2022 Negative  25 ng/mL Final    Temazepam 05/02/2022 Negative  25 ng/mL Final    THC-COOH 05/02/2022 Negative  25 ng/mL Final    Tramadol 05/02/2022 Negative  100 ng/mL Final    Amphetamine, Urine 05/02/2022 Negative  Negative 100 ng/mL Final    Methamphetamines, Urine 05/02/2022 Negative  Negative 100 ng/mL Final    Methadone, Urine 05/02/2022  Negative  Negative 25 ng/mL Final    Oxycodone, Urine 05/02/2022 Negative  Negative 25 ng/mL Final         No orders of the defined types were placed in this encounter.        Requested Prescriptions     Signed Prescriptions Disp Refills    amitriptyline (ELAVIL) 50 MG tablet 30 tablet 2     Sig: Take 1 tablet (50 mg total) by mouth nightly as needed for Insomnia or Pain.    acetaminophen-codeine 300-60mg (TYLENOL #4) 300-60 mg Tab 120 tablet 2     Sig: Take 1 tablet by mouth every 6 (six) hours as needed (pain).    tiZANidine 4 mg Cap 90 capsule 2     Sig: Take 4 mg by mouth every 8 (eight) hours as needed (Spasm).       Assessment:     1. Lumbosacral spondylosis without myelopathy    2. Thoracic spondylosis    3. Disorder of sacrum    4. Cervical radiculopathy         A's of Opioid Risk Assessment  Activity:Patient can perform ADL.   Analgesia:Patients pain is partially controlled by current medication. Patient has tried OTC medications such as Tylenol and Ibuprofen with out relief.   Adverse Effects: Patient denies constipation or sedation.  Aberrant Behavior:  reviewed with no aberrant drug seeking/taking behavior.  Overdose reversal drug naloxone discussed    Drug screen reviewed      Plan:    No show for procedure    Definitive drug screen May 2, 2022 negative, was not using narcotics that time    Complaint increasing cervical spine discomfort back pain requesting adjustment pain medication short-term     Requesting Toradol injection    Complaint of cervical spine discomfort bilateral arm pain numbness and tingling radicular in nature     Toradol 60 mg IM, tolerated well    Tylenol # 4  1 p.o. q.6 hours    Tizanidine 4 mg 1 p.o. q.8 hours p.r.n.    Continue home exercise program as directed    Follow-up 3 months    Dr. Chaney August 2023    Bring original prescription medication bottles/container/box with labels to each visit    Pill count    Physical therapy    Massage therapy declines

## 2023-01-13 ENCOUNTER — HOSPITAL ENCOUNTER (EMERGENCY)
Facility: HOSPITAL | Age: 78
Discharge: HOME | End: 2023-01-13
Payer: MEDICARE

## 2023-01-13 VITALS
DIASTOLIC BLOOD PRESSURE: 68 MMHG | SYSTOLIC BLOOD PRESSURE: 176 MMHG | RESPIRATION RATE: 16 BRPM | OXYGEN SATURATION: 97 % | WEIGHT: 148 LBS | HEIGHT: 61 IN | BODY MASS INDEX: 27.94 KG/M2 | TEMPERATURE: 98 F | HEART RATE: 88 BPM

## 2023-01-13 DIAGNOSIS — N30.01 ACUTE CYSTITIS WITH HEMATURIA: ICD-10-CM

## 2023-01-13 DIAGNOSIS — R30.0 DYSURIA: Primary | ICD-10-CM

## 2023-01-13 LAB
BACTERIA #/AREA URNS HPF: ABNORMAL /HPF
BILIRUB UR QL STRIP: NEGATIVE
CLARITY UR: ABNORMAL
COLOR UR: ABNORMAL
GLUCOSE UR STRIP-MCNC: NORMAL MG/DL
KETONES UR STRIP-SCNC: NEGATIVE MG/DL
LEUKOCYTE ESTERASE UR QL STRIP: ABNORMAL
MUCOUS THREADS #/AREA URNS HPF: ABNORMAL /HPF
NITRITE UR QL STRIP: POSITIVE
PH UR STRIP: 6.5 PH UNITS
PROT UR QL STRIP: NEGATIVE
RBC # UR STRIP: NEGATIVE /UL
RBC #/AREA URNS HPF: ABNORMAL /HPF
SP GR UR STRIP: 1.01
SQUAMOUS #/AREA URNS LPF: ABNORMAL /LPF
TRICHOMONAS #/AREA URNS HPF: ABNORMAL /HPF
UROBILINOGEN UR STRIP-ACNC: NORMAL MG/DL
WBC #/AREA URNS HPF: ABNORMAL /HPF
YEAST #/AREA URNS HPF: ABNORMAL /HPF

## 2023-01-13 PROCEDURE — 99284 EMERGENCY DEPT VISIT MOD MDM: CPT

## 2023-01-13 PROCEDURE — 99284 EMERGENCY DEPT VISIT MOD MDM: CPT | Mod: ,,, | Performed by: NURSE PRACTITIONER

## 2023-01-13 PROCEDURE — 81001 URINALYSIS AUTO W/SCOPE: CPT | Performed by: EMERGENCY MEDICINE

## 2023-01-13 PROCEDURE — 99284 PR EMERGENCY DEPT VISIT,LEVEL IV: ICD-10-PCS | Mod: ,,, | Performed by: NURSE PRACTITIONER

## 2023-01-13 PROCEDURE — 87186 SC STD MICRODIL/AGAR DIL: CPT | Performed by: EMERGENCY MEDICINE

## 2023-01-13 PROCEDURE — 63600175 PHARM REV CODE 636 W HCPCS: Performed by: NURSE PRACTITIONER

## 2023-01-13 PROCEDURE — 87077 CULTURE AEROBIC IDENTIFY: CPT | Performed by: EMERGENCY MEDICINE

## 2023-01-13 PROCEDURE — 96372 THER/PROPH/DIAG INJ SC/IM: CPT | Performed by: NURSE PRACTITIONER

## 2023-01-13 RX ORDER — NITROFURANTOIN 25; 75 MG/1; MG/1
100 CAPSULE ORAL 2 TIMES DAILY
Qty: 10 CAPSULE | Refills: 0 | Status: SHIPPED | OUTPATIENT
Start: 2023-01-13 | End: 2023-01-18

## 2023-01-13 RX ORDER — CEFTRIAXONE 1 G/1
1 INJECTION, POWDER, FOR SOLUTION INTRAMUSCULAR; INTRAVENOUS
Status: COMPLETED | OUTPATIENT
Start: 2023-01-13 | End: 2023-01-13

## 2023-01-13 RX ADMIN — CEFTRIAXONE SODIUM 1 G: 1 INJECTION, POWDER, FOR SOLUTION INTRAMUSCULAR; INTRAVENOUS at 02:01

## 2023-01-13 NOTE — PROVIDER PROGRESS NOTES - EMERGENCY DEPT.
Encounter Date: 1/13/2023    ED Physician Progress Notes        MDM  Ms. Guerra presents to ED c complaints of UTI onset 2 days ago, pain became worse yesterday. Her PCP was closed today so she came to ED. She states she hasn't ran fever but she feels feverish.   Diagnose- UTI  Plan- rocephin im today treating UTI c macrobid

## 2023-01-13 NOTE — ED TRIAGE NOTES
Pt presents to ed with c/o having painful urination, abdominal pain and some confusion. States that she has confusion when she usually has a UTI

## 2023-01-13 NOTE — ED PROVIDER NOTES
"Encounter Date: 2023       History     Chief Complaint   Patient presents with    Dysuria     Ms. Guerra presents to ED c complaints of UTI onset 2 days ago, pain became worse yesterday. Her PCP was closed today so she came to ED. She states she hasn't ran fever but she feels feverish.     Review of patient's allergies indicates:   Allergen Reactions    Biaxin [clarithromycin] Other (See Comments)     Stomach pain    Celebrex [celecoxib] Other (See Comments)     "Picking and cellulitis"    Opioids - morphine analogues Other (See Comments)     Morphine Makes me loopy     Zyrtec [cetirizine] Other (See Comments)     Stomach Ache     Past Medical History:   Diagnosis Date    Asthma     Cervical radiculopathy     Chronic back pain     Chronic pain syndrome     Depressive disorder     Disorder of sacrum     Dyslipidemia     Enlarged liver     Essential (primary) hypertension     Glycosuria     Hypokalemia     Lumbar radiculopathy     Migraine     Mild chronic obstructive pulmonary disease     Neck pain     Osteoarthritis of multiple joints      Past Surgical History:   Procedure Laterality Date    ADENOIDECTOMY       SECTION      CHOLECYSTECTOMY      HYSTERECTOMY      INJECTION OF ANESTHETIC AGENT AROUND MEDIAL BRANCH NERVES INNERVATING LUMBAR FACET JOINT Bilateral 2021    Procedure: Block-nerve-medial branch-lumbar, bilateral L3 through S1;  Surgeon: Collette Chaney MD;  Location: [facility];  Service: Pain Management;  Laterality: Bilateral;    INJECTION OF FACET JOINT Bilateral 2019    Bilateral L3-S1 Facet Injection x3    KNEE SURGERY Right 2019    NECK SURGERY  2005    ORIF SUPRACONDYLAR FEMUR Right 2019    RADIOFREQUENCY THERMOCOAGULATION Left 10/09/2019    Left L3-S1 RFTC    RADIOFREQUENCY THERMOCOAGULATION Right 10/23/2019    Right L3-S1 RFTC    TONSILLECTOMY      TOTAL KNEE ARTHROPLASTY Bilateral      Family History   Problem Relation Age of Onset    Stroke Mother     " Hypertension Mother     Stroke Maternal Aunt     Abnormal EKG Paternal Uncle     Alcohol abuse Paternal Uncle     Stroke Maternal Grandmother      Social History     Tobacco Use    Smoking status: Never    Smokeless tobacco: Never   Substance Use Topics    Alcohol use: Never    Drug use: Yes     Types: Hydrocodone     Review of Systems   Genitourinary:  Positive for dysuria.     Physical Exam     Initial Vitals [01/13/23 1339]   BP Pulse Resp Temp SpO2   (!) 176/68 88 16 97.5 °F (36.4 °C) 97 %      MAP       --         Physical Exam    Constitutional: She appears well-developed.   HENT:   Head: Normocephalic.   Eyes: Pupils are equal, round, and reactive to light.   Neck:   Normal range of motion.  Cardiovascular:  Normal rate.           Pulmonary/Chest: Breath sounds normal.   Abdominal: Abdomen is soft. Bowel sounds are normal.   Musculoskeletal:         General: Normal range of motion.      Cervical back: Normal range of motion.     Neurological: She is alert and oriented to person, place, and time. GCS score is 15. GCS eye subscore is 4. GCS verbal subscore is 5. GCS motor subscore is 6.   Skin: Skin is warm. Capillary refill takes less than 2 seconds.   Psychiatric: She has a normal mood and affect. Her behavior is normal. Judgment and thought content normal.       Medical Screening Exam   See Full Note    ED Course   Procedures  Labs Reviewed   URINALYSIS, REFLEX TO URINE CULTURE - Abnormal; Notable for the following components:       Result Value    Leukocytes, UA Moderate (*)     Nitrites, UA Positive (*)     All other components within normal limits   URINALYSIS, MICROSCOPIC - Abnormal; Notable for the following components:    Bacteria, UA Loaded (*)     Yeast, UA Few (*)     All other components within normal limits   CULTURE, URINE          Imaging Results    None          Medications   cefTRIAXone injection 1 g (has no administration in time range)                       Clinical Impression:   Final  diagnoses:  [R30.0] Dysuria (Primary)  [N30.01] Acute cystitis with hematuria        ED Disposition Condition    Discharge Stable          ED Prescriptions       Medication Sig Dispense Start Date End Date Auth. Provider    nitrofurantoin, macrocrystal-monohydrate, (MACROBID) 100 MG capsule Take 1 capsule (100 mg total) by mouth 2 (two) times daily. for 5 days 10 capsule 1/13/2023 1/18/2023 TIFFANY Saldana          Follow-up Information    None          TIFFANY Saldana  01/13/23 5215

## 2023-01-15 LAB — UA COMPLETE W REFLEX CULTURE PNL UR: ABNORMAL

## 2023-03-07 ENCOUNTER — OFFICE VISIT (OUTPATIENT)
Dept: PAIN MEDICINE | Facility: CLINIC | Age: 78
End: 2023-03-07
Payer: MEDICARE

## 2023-03-07 VITALS
BODY MASS INDEX: 28.07 KG/M2 | HEART RATE: 101 BPM | RESPIRATION RATE: 18 BRPM | DIASTOLIC BLOOD PRESSURE: 76 MMHG | WEIGHT: 143 LBS | SYSTOLIC BLOOD PRESSURE: 133 MMHG | HEIGHT: 60 IN

## 2023-03-07 DIAGNOSIS — M54.12 CERVICAL RADICULOPATHY: Chronic | ICD-10-CM

## 2023-03-07 DIAGNOSIS — M47.814 THORACIC SPONDYLOSIS: Chronic | ICD-10-CM

## 2023-03-07 DIAGNOSIS — M53.3 DISORDER OF SACRUM: Chronic | ICD-10-CM

## 2023-03-07 DIAGNOSIS — M47.817 LUMBOSACRAL SPONDYLOSIS WITHOUT MYELOPATHY: Primary | Chronic | ICD-10-CM

## 2023-03-07 DIAGNOSIS — Z79.899 ENCOUNTER FOR LONG-TERM (CURRENT) USE OF OTHER MEDICATIONS: ICD-10-CM

## 2023-03-07 LAB
CTP QC/QA: YES
POC (AMP) AMPHETAMINE: NEGATIVE
POC (BAR) BARBITURATES: ABNORMAL
POC (BUP) BUPRENORPHINE: NEGATIVE
POC (BZO) BENZODIAZEPINES: NEGATIVE
POC (COC) COCAINE: NEGATIVE
POC (MDMA) METHYLENEDIOXYMETHAMPHETAMINE 3,4: NEGATIVE
POC (MET) METHAMPHETAMINE: NEGATIVE
POC (MOP) OPIATES: ABNORMAL
POC (MTD) METHADONE: NEGATIVE
POC (OXY) OXYCODONE: NEGATIVE
POC (PCP) PHENCYCLIDINE: NEGATIVE
POC (TCA) TRICYCLIC ANTIDEPRESSANTS: NEGATIVE
POC TEMPERATURE (URINE): 90
POC THC: NEGATIVE

## 2023-03-07 PROCEDURE — 99214 OFFICE O/P EST MOD 30 MIN: CPT | Mod: S$PBB,,, | Performed by: PHYSICIAN ASSISTANT

## 2023-03-07 PROCEDURE — 99214 OFFICE O/P EST MOD 30 MIN: CPT | Mod: PBBFAC | Performed by: PHYSICIAN ASSISTANT

## 2023-03-07 PROCEDURE — 99214 PR OFFICE/OUTPT VISIT, EST, LEVL IV, 30-39 MIN: ICD-10-PCS | Mod: S$PBB,,, | Performed by: PHYSICIAN ASSISTANT

## 2023-03-07 PROCEDURE — G0481 DRUG TEST DEF 8-14 CLASSES: HCPCS | Performed by: PHYSICIAN ASSISTANT

## 2023-03-07 PROCEDURE — 80305 DRUG TEST PRSMV DIR OPT OBS: CPT | Mod: PBBFAC | Performed by: PHYSICIAN ASSISTANT

## 2023-03-07 RX ORDER — NALOXONE HYDROCHLORIDE 4 MG/.1ML
1 SPRAY NASAL ONCE
Qty: 1 EACH | Refills: 0 | Status: SHIPPED | OUTPATIENT
Start: 2023-03-07 | End: 2023-03-07

## 2023-03-07 RX ORDER — ACETAMINOPHEN AND CODEINE PHOSPHATE 300; 60 MG/1; MG/1
1 TABLET ORAL EVERY 6 HOURS PRN
Qty: 120 TABLET | Refills: 2 | Status: SHIPPED | OUTPATIENT
Start: 2023-03-07 | End: 2023-06-02 | Stop reason: SDUPTHER

## 2023-03-07 RX ORDER — TIZANIDINE HYDROCHLORIDE 4 MG/1
4 CAPSULE, GELATIN COATED ORAL EVERY 8 HOURS PRN
Qty: 90 CAPSULE | Refills: 2 | Status: SHIPPED | OUTPATIENT
Start: 2023-03-07 | End: 2023-06-02 | Stop reason: SDUPTHER

## 2023-03-07 RX ORDER — CARVEDILOL 3.12 MG/1
3.12 TABLET ORAL 2 TIMES DAILY WITH MEALS
COMMUNITY

## 2023-03-07 RX ORDER — AMITRIPTYLINE HYDROCHLORIDE 50 MG/1
50 TABLET, FILM COATED ORAL NIGHTLY PRN
Qty: 30 TABLET | Refills: 2 | Status: SHIPPED | OUTPATIENT
Start: 2023-03-07 | End: 2023-06-02 | Stop reason: SDUPTHER

## 2023-03-07 NOTE — PROGRESS NOTES
Subjective:         Patient ID: Martina Guerra is a 77 y.o. female.    Chief Complaint: Low-back Pain and Neck Pain        Pain  This is a chronic problem. The current episode started more than 1 year ago. The problem occurs daily. The problem has been waxing and waning. Associated symptoms include arthralgias and neck pain. Pertinent negatives include no anorexia, chest pain, chills, coughing, diaphoresis, fatigue, fever, sore throat, vertigo or vomiting.   Review of Systems   Constitutional:  Negative for activity change, appetite change, chills, diaphoresis, fatigue, fever and unexpected weight change.   HENT:  Negative for drooling, ear discharge, ear pain, facial swelling, nosebleeds, sore throat, trouble swallowing, voice change and goiter.    Eyes:  Negative for photophobia, pain, discharge, redness and visual disturbance.   Respiratory:  Negative for apnea, cough, choking, chest tightness, shortness of breath, wheezing and stridor.    Cardiovascular:  Negative for chest pain, palpitations and leg swelling.   Gastrointestinal:  Negative for abdominal distention, anorexia, diarrhea, rectal pain, vomiting and fecal incontinence.   Endocrine: Negative for cold intolerance, heat intolerance, polydipsia, polyphagia and polyuria.   Genitourinary:  Negative for bladder incontinence, dysuria, flank pain, frequency and hot flashes.   Musculoskeletal:  Positive for arthralgias, back pain, leg pain, neck pain and neck stiffness.   Integumentary:  Negative for color change and pallor.   Allergic/Immunologic: Negative for immunocompromised state.   Neurological:  Negative for dizziness, vertigo, seizures, syncope, facial asymmetry, speech difficulty, light-headedness, coordination difficulties, memory loss and coordination difficulties.   Hematological:  Negative for adenopathy. Does not bruise/bleed easily.   Psychiatric/Behavioral:  Negative for agitation, behavioral problems, confusion, decreased concentration,  dysphoric mood, hallucinations, self-injury and suicidal ideas. The patient is not nervous/anxious and is not hyperactive.          Past Medical History:   Diagnosis Date    Asthma     Cervical radiculopathy     Chronic back pain     Chronic pain syndrome     Depressive disorder     Disorder of sacrum     Dyslipidemia     Enlarged liver     Essential (primary) hypertension     Glycosuria     Hypokalemia     Lumbar radiculopathy     Migraine     Mild chronic obstructive pulmonary disease     Neck pain     Osteoarthritis of multiple joints      Past Surgical History:   Procedure Laterality Date    ADENOIDECTOMY       SECTION      CHOLECYSTECTOMY      HYSTERECTOMY      INJECTION OF ANESTHETIC AGENT AROUND MEDIAL BRANCH NERVES INNERVATING LUMBAR FACET JOINT Bilateral 2021    Procedure: Block-nerve-medial branch-lumbar, bilateral L3 through S1;  Surgeon: Collette Chaney MD;  Location: [facility];  Service: Pain Management;  Laterality: Bilateral;    INJECTION OF FACET JOINT Bilateral 2019    Bilateral L3-S1 Facet Injection x3    KNEE SURGERY Right 2019    NECK SURGERY  2005    ORIF SUPRACONDYLAR FEMUR Right 2019    RADIOFREQUENCY THERMOCOAGULATION Left 10/09/2019    Left L3-S1 RFTC    RADIOFREQUENCY THERMOCOAGULATION Right 10/23/2019    Right L3-S1 RFTC    TONSILLECTOMY      TOTAL KNEE ARTHROPLASTY Bilateral      Social History     Socioeconomic History    Marital status:    Tobacco Use    Smoking status: Never    Smokeless tobacco: Never   Substance and Sexual Activity    Alcohol use: Never    Drug use: Yes     Types: Hydrocodone     Family History   Problem Relation Age of Onset    Stroke Mother     Hypertension Mother     Stroke Maternal Aunt     Abnormal EKG Paternal Uncle     Alcohol abuse Paternal Uncle     Stroke Maternal Grandmother      Review of patient's allergies indicates:   Allergen Reactions    Biaxin [clarithromycin] Other (See Comments)     Stomach pain     "Celebrex [celecoxib] Other (See Comments)     "Picking and cellulitis"    Opioids - morphine analogues Other (See Comments)     Morphine Makes me loopy     Zyrtec [cetirizine] Other (See Comments)     Stomach Ache        Objective:  Vitals:    03/07/23 1308   BP: 133/76   Pulse: 101   Resp: 18   Weight: 64.9 kg (143 lb)   Height: 5' (1.524 m)   PainSc:   5         Physical Exam  Vitals and nursing note reviewed. Exam conducted with a chaperone present.   Constitutional:       General: She is awake. She is not in acute distress.     Appearance: Normal appearance. She is not ill-appearing, toxic-appearing or diaphoretic.   HENT:      Head: Normocephalic and atraumatic.      Nose: Nose normal.      Mouth/Throat:      Mouth: Mucous membranes are moist.      Pharynx: Oropharynx is clear.   Eyes:      Conjunctiva/sclera: Conjunctivae normal.      Pupils: Pupils are equal, round, and reactive to light.   Cardiovascular:      Rate and Rhythm: Normal rate.   Pulmonary:      Effort: Pulmonary effort is normal. No respiratory distress.   Abdominal:      Palpations: Abdomen is soft.   Musculoskeletal:         General: Normal range of motion.      Cervical back: Normal range of motion and neck supple. Tenderness present.      Lumbar back: Tenderness present.   Skin:     General: Skin is warm and dry.      Coloration: Skin is not jaundiced or pale.   Neurological:      General: No focal deficit present.      Mental Status: She is alert and oriented to person, place, and time. Mental status is at baseline.      Cranial Nerves: No cranial nerve deficit (II-XII).   Psychiatric:         Mood and Affect: Mood normal.         Behavior: Behavior normal. Behavior is cooperative.         Thought Content: Thought content normal.         FL Fluoro for Pain Management  See OP Notes for results.     IMPRESSION: See OP Notes for results.     This procedure was auto-finalized by: Virtual Radiologist         Admission on 01/13/2023, Discharged " on 01/13/2023   Component Date Value Ref Range Status    Color, UA 01/13/2023 Light Yellow  Colorless, Straw, Yellow, Light Yellow, Dark Yellow Final    Clarity, UA 01/13/2023 Slightly Cloudy  Clear Final    pH, UA 01/13/2023 6.5  5.0 to 8.0 pH Units Final    Leukocytes, UA 01/13/2023 Moderate (A)  Negative Final    Nitrites, UA 01/13/2023 Positive (A)  Negative Final    Protein, UA 01/13/2023 Negative  Negative Final    Glucose, UA 01/13/2023 Normal  Normal mg/dL Final    Ketones, UA 01/13/2023 Negative  Negative mg/dL Final    Urobilinogen, UA 01/13/2023 Normal  0.2, 1.0, Normal mg/dL Final    Bilirubin, UA 01/13/2023 Negative  Negative Final    Blood, UA 01/13/2023 Negative  Negative Final    Specific Gravity, UA 01/13/2023 1.014  <=1.030 Final    WBC, UA 01/13/2023 0-5  None Seen, 0-5 /hpf Final    RBC, UA 01/13/2023 0-3  None Seen, 0-3 /hpf Final    Bacteria, UA 01/13/2023 Loaded (A)  None Seen /hpf Final    Yeast, UA 01/13/2023 Few (A)  None Seen /hpf Final    Squamous Epithelial Cells, UA 01/13/2023 None Seen To Occasional  None Seen, Rare, None Seen To Occasional /lpf Final    Mucus, UA 01/13/2023 None Seen  None Seen /hpf Final    Trichomonas, UA 01/13/2023 None Seen  None Seen /hpf Final    Culture, Urine 01/13/2023 >100,000 Escherichia coli (A)   Final         Orders Placed This Encounter   Procedures    Drug Screen Definitive 14, Urine     Standing Status:   Future     Number of Occurrences:   1     Standing Expiration Date:   5/5/2024     Order Specific Question:   Specimen Source     Answer:   Urine    POCT Urine Drug Screen Presump     Interpretive Information:     Negative:  No drug detected at the cut off level.   Positive:  This result represents presumptive positive for the   tested drug, other substances may yield a positive response other   than the analyte of interest. This result should be utilized for   diagnostic purpose only. Confirmation testing will be performed upon physician request  only.              Requested Prescriptions     Signed Prescriptions Disp Refills    tiZANidine 4 mg Cap 90 capsule 2     Sig: Take 4 mg by mouth every 8 (eight) hours as needed (Spasm).    amitriptyline (ELAVIL) 50 MG tablet 30 tablet 2     Sig: Take 1 tablet (50 mg total) by mouth nightly as needed for Insomnia or Pain.    acetaminophen-codeine 300-60mg (TYLENOL #4) 300-60 mg Tab 120 tablet 2     Sig: Take 1 tablet by mouth every 6 (six) hours as needed (pain).    naloxone (NARCAN) 4 mg/actuation Spry 1 each 0     Si spray (4 mg total) by Nasal route once. Call 911, One sprays alternate nostril, may repeat 2-3 minutes as needed for 1 dose       Assessment:     1. Lumbosacral spondylosis without myelopathy    2. Thoracic spondylosis    3. Cervical radiculopathy    4. Disorder of sacrum    5. Encounter for long-term (current) use of other medications         A's of Opioid Risk Assessment  Activity:Patient can perform ADL.   Analgesia:Patients pain is partially controlled by current medication. Patient has tried OTC medications such as Tylenol and Ibuprofen with out relief.   Adverse Effects: Patient denies constipation or sedation.  Aberrant Behavior:  reviewed with no aberrant drug seeking/taking behavior.  Overdose reversal drug naloxone discussed    Drug screen reviewed      Plan:    Narcan 2023    Definitive drug screen May 2, 2022 negative, was not using narcotics that time    No new complaints today she is very pleased with Tylenol 4 she states it is helping control her discomfort    She would like to continue with conservative management    Continue home exercise program as directed    Continue current medications    Follow-up 3 months    Dr. Chaney 2023    Bring original prescription medication bottles/container/box with labels to each visit    Pill count    Physical therapy    Massage therapy declines

## 2023-03-13 LAB — BEAKER SEE SCANNED REPORT: NORMAL

## 2023-06-02 NOTE — PROGRESS NOTES
Subjective:         Patient ID: Martina Guerra is a 77 y.o. female.    Chief Complaint: Back Pain, Neck Pain, and Shoulder Pain        Pain  This is a chronic problem. The current episode started more than 1 year ago. The problem occurs daily. The problem has been unchanged. Associated symptoms include arthralgias and neck pain. Pertinent negatives include no anorexia, chest pain, chills, coughing, diaphoresis, fever, sore throat, vertigo or vomiting.   Review of Systems   Constitutional:  Negative for activity change, appetite change, chills, diaphoresis, fever and unexpected weight change.   HENT:  Negative for drooling, ear discharge, ear pain, facial swelling, nosebleeds, sore throat, trouble swallowing, voice change and goiter.    Eyes:  Negative for photophobia, pain, discharge, redness and visual disturbance.   Respiratory:  Negative for apnea, cough, choking, chest tightness, shortness of breath, wheezing and stridor.    Cardiovascular:  Negative for chest pain, palpitations and leg swelling.   Gastrointestinal:  Negative for abdominal distention, anorexia, diarrhea, rectal pain, vomiting and fecal incontinence.   Endocrine: Negative for cold intolerance, heat intolerance, polydipsia, polyphagia and polyuria.   Genitourinary:  Negative for bladder incontinence, dysuria, flank pain, frequency and hot flashes.   Musculoskeletal:  Positive for arthralgias, back pain, leg pain, neck pain and neck stiffness.   Integumentary:  Negative for color change and pallor.   Allergic/Immunologic: Negative for immunocompromised state.   Neurological:  Negative for dizziness, vertigo, seizures, syncope, facial asymmetry, speech difficulty, light-headedness, coordination difficulties, memory loss and coordination difficulties.   Hematological:  Negative for adenopathy. Does not bruise/bleed easily.   Psychiatric/Behavioral:  Negative for agitation, behavioral problems, confusion, decreased concentration, dysphoric mood,  hallucinations, self-injury and suicidal ideas. The patient is not nervous/anxious and is not hyperactive.          Past Medical History:   Diagnosis Date    Asthma     Cervical radiculopathy     Chronic back pain     Chronic pain syndrome     Depressive disorder     Disorder of sacrum     Dyslipidemia     Enlarged liver     Essential (primary) hypertension     Glycosuria     Hypokalemia     Lumbar radiculopathy     Migraine     Mild chronic obstructive pulmonary disease     Neck pain     Osteoarthritis of multiple joints      Past Surgical History:   Procedure Laterality Date    ADENOIDECTOMY       SECTION      CHOLECYSTECTOMY      HYSTERECTOMY      INJECTION OF ANESTHETIC AGENT AROUND MEDIAL BRANCH NERVES INNERVATING LUMBAR FACET JOINT Bilateral 2021    Procedure: Block-nerve-medial branch-lumbar, bilateral L3 through S1;  Surgeon: Collette Chaney MD;  Location: [facility];  Service: Pain Management;  Laterality: Bilateral;    INJECTION OF FACET JOINT Bilateral 2019    Bilateral L3-S1 Facet Injection x3    KNEE SURGERY Right 2019    NECK SURGERY  2005    ORIF SUPRACONDYLAR FEMUR Right 2019    RADIOFREQUENCY THERMOCOAGULATION Left 10/09/2019    Left L3-S1 RFTC    RADIOFREQUENCY THERMOCOAGULATION Right 10/23/2019    Right L3-S1 RFTC    TONSILLECTOMY      TOTAL KNEE ARTHROPLASTY Bilateral      Social History     Socioeconomic History    Marital status:    Tobacco Use    Smoking status: Never    Smokeless tobacco: Never   Substance and Sexual Activity    Alcohol use: Never    Drug use: Yes     Types: Hydrocodone     Family History   Problem Relation Age of Onset    Stroke Mother     Hypertension Mother     Stroke Maternal Aunt     Abnormal EKG Paternal Uncle     Alcohol abuse Paternal Uncle     Stroke Maternal Grandmother      Review of patient's allergies indicates:   Allergen Reactions    Biaxin [clarithromycin] Other (See Comments)     Stomach pain    Celebrex [celecoxib]  "Other (See Comments)     "Picking and cellulitis"    Opioids - morphine analogues Other (See Comments)     Morphine Makes me loopy     Zyrtec [cetirizine] Other (See Comments)     Stomach Ache        Objective:  Vitals:    06/06/23 1259   BP: (!) 152/71   Pulse: 100   Resp: 20   Weight: 63.5 kg (140 lb)   Height: 5' 1" (1.549 m)   PainSc:   7         Physical Exam  Vitals and nursing note reviewed. Exam conducted with a chaperone present.   Constitutional:       General: She is awake. She is not in acute distress.     Appearance: Normal appearance. She is not ill-appearing, toxic-appearing or diaphoretic.   HENT:      Head: Normocephalic and atraumatic.      Nose: Nose normal.      Mouth/Throat:      Mouth: Mucous membranes are moist.      Pharynx: Oropharynx is clear.   Eyes:      Conjunctiva/sclera: Conjunctivae normal.      Pupils: Pupils are equal, round, and reactive to light.   Cardiovascular:      Rate and Rhythm: Normal rate.   Pulmonary:      Effort: Pulmonary effort is normal. No respiratory distress.   Abdominal:      Palpations: Abdomen is soft.   Musculoskeletal:         General: Normal range of motion.      Cervical back: Normal range of motion and neck supple. Tenderness present.      Lumbar back: Tenderness present.   Skin:     General: Skin is warm and dry.      Coloration: Skin is not jaundiced or pale.   Neurological:      General: No focal deficit present.      Mental Status: She is alert and oriented to person, place, and time. Mental status is at baseline.      Cranial Nerves: No cranial nerve deficit (II-XII).   Psychiatric:         Mood and Affect: Mood normal.         Behavior: Behavior normal. Behavior is cooperative.         Thought Content: Thought content normal.         FL Fluoro for Pain Management  See OP Notes for results.     IMPRESSION: See OP Notes for results.     This procedure was auto-finalized by: Virtual Radiologist         Office Visit on 03/07/2023   Component Date Value " Ref Range Status    POC Amphetamines 03/07/2023 Negative  Negative, Inconclusive Final    POC Barbiturates 03/07/2023 Presumptive Positive (A)  Negative, Inconclusive Final    POC Benzodiazepines 03/07/2023 Negative  Negative, Inconclusive Final    POC Cocaine 03/07/2023 Negative  Negative, Inconclusive Final    POC THC 03/07/2023 Negative  Negative, Inconclusive Final    POC Methadone 03/07/2023 Negative  Negative, Inconclusive Final    POC Methamphetamine 03/07/2023 Negative  Negative, Inconclusive Final    POC Opiates 03/07/2023 Presumptive Positive (A)  Negative, Inconclusive Final    POC Oxycodone 03/07/2023 Negative  Negative, Inconclusive Final    POC Phencyclidine 03/07/2023 Negative  Negative, Inconclusive Final    POC Methylenedioxymethamphetamine * 03/07/2023 Negative  Negative, Inconclusive Final    POC Tricyclic Antidepressants 03/07/2023 Negative  Negative, Inconclusive Final    POC Buprenorphine 03/07/2023 Negative   Final     Acceptable 03/07/2023 Yes   Final    POC Temperature (Urine) 03/07/2023 90   Final    See Scanned Report 03/07/2023 See Scanned Result   Final   Admission on 01/13/2023, Discharged on 01/13/2023   Component Date Value Ref Range Status    Color, UA 01/13/2023 Light Yellow  Colorless, Straw, Yellow, Light Yellow, Dark Yellow Final    Clarity, UA 01/13/2023 Slightly Cloudy  Clear Final    pH, UA 01/13/2023 6.5  5.0 to 8.0 pH Units Final    Leukocytes, UA 01/13/2023 Moderate (A)  Negative Final    Nitrites, UA 01/13/2023 Positive (A)  Negative Final    Protein, UA 01/13/2023 Negative  Negative Final    Glucose, UA 01/13/2023 Normal  Normal mg/dL Final    Ketones, UA 01/13/2023 Negative  Negative mg/dL Final    Urobilinogen, UA 01/13/2023 Normal  0.2, 1.0, Normal mg/dL Final    Bilirubin, UA 01/13/2023 Negative  Negative Final    Blood, UA 01/13/2023 Negative  Negative Final    Specific Gravity, UA 01/13/2023 1.014  <=1.030 Final    WBC, UA 01/13/2023 0-5  None Seen,  0-5 /hpf Final    RBC, UA 01/13/2023 0-3  None Seen, 0-3 /hpf Final    Bacteria, UA 01/13/2023 Loaded (A)  None Seen /hpf Final    Yeast, UA 01/13/2023 Few (A)  None Seen /hpf Final    Squamous Epithelial Cells, UA 01/13/2023 None Seen To Occasional  None Seen, Rare, None Seen To Occasional /lpf Final    Mucus, UA 01/13/2023 None Seen  None Seen /hpf Final    Trichomonas, UA 01/13/2023 None Seen  None Seen /hpf Final    Culture, Urine 01/13/2023 >100,000 Escherichia coli (A)   Final         Orders Placed This Encounter   Procedures    Drug Screen Definitive 14, Urine     Standing Status:   Future     Number of Occurrences:   1     Standing Expiration Date:   8/4/2024     Order Specific Question:   Specimen Source     Answer:   Urine    POCT Urine Drug Screen Presump     Interpretive Information:     Negative:  No drug detected at the cut off level.   Positive:  This result represents presumptive positive for the   tested drug, other substances may yield a positive response other   than the analyte of interest. This result should be utilized for   diagnostic purpose only. Confirmation testing will be performed upon physician request only.              Requested Prescriptions     Signed Prescriptions Disp Refills    tiZANidine 4 mg Cap 90 capsule 2     Sig: Take 4 mg by mouth every 8 (eight) hours as needed (Spasm).    amitriptyline (ELAVIL) 75 MG tablet 30 tablet 2     Sig: Take 1 tablet (75 mg total) by mouth nightly as needed for Insomnia or Pain.    acetaminophen-codeine 300-60mg (TYLENOL #4) 300-60 mg Tab 120 tablet 2     Sig: Take 1 tablet by mouth every 6 (six) hours as needed (pain).       Assessment:     1. Lumbosacral spondylosis without myelopathy    2. Thoracic spondylosis    3. Cervical radiculopathy    4. Disorder of sacrum    5. Encounter for long-term (current) use of other medications         A's of Opioid Risk Assessment  Activity:Patient can perform ADL.   Analgesia:Patients pain is partially  controlled by current medication. Patient has tried OTC medications such as Tylenol and Ibuprofen with out relief.   Adverse Effects: Patient denies constipation or sedation.  Aberrant Behavior:  reviewed with no aberrant drug seeking/taking behavior.  Overdose reversal drug naloxone discussed    Drug screen reviewed      Plan:    June 19, 2023 definitive drug screen returned positive for morphine, hydrocodone and metabolite, patient takes Tylenol 3 drug screen is positive for codeine which would be consistent    Discontinue narcotics        Narcan March 2023    She states current medications helping control her discomfort    She would like to continue with conservative management    Continue home exercise program as directed    Continue current medications    Follow-up 3 months    Dr. Chaney August 2023    Bring original prescription medication bottles/container/box with labels to each visit

## 2023-06-06 ENCOUNTER — OFFICE VISIT (OUTPATIENT)
Dept: PAIN MEDICINE | Facility: CLINIC | Age: 78
End: 2023-06-06
Payer: MEDICARE

## 2023-06-06 VITALS
HEIGHT: 61 IN | HEART RATE: 100 BPM | RESPIRATION RATE: 20 BRPM | WEIGHT: 140 LBS | BODY MASS INDEX: 26.43 KG/M2 | DIASTOLIC BLOOD PRESSURE: 71 MMHG | SYSTOLIC BLOOD PRESSURE: 152 MMHG

## 2023-06-06 DIAGNOSIS — M47.814 THORACIC SPONDYLOSIS: Chronic | ICD-10-CM

## 2023-06-06 DIAGNOSIS — M47.817 LUMBOSACRAL SPONDYLOSIS WITHOUT MYELOPATHY: Primary | Chronic | ICD-10-CM

## 2023-06-06 DIAGNOSIS — Z79.899 ENCOUNTER FOR LONG-TERM (CURRENT) USE OF OTHER MEDICATIONS: ICD-10-CM

## 2023-06-06 DIAGNOSIS — M53.3 DISORDER OF SACRUM: Chronic | ICD-10-CM

## 2023-06-06 DIAGNOSIS — M54.12 CERVICAL RADICULOPATHY: Chronic | ICD-10-CM

## 2023-06-06 PROCEDURE — 99214 PR OFFICE/OUTPT VISIT, EST, LEVL IV, 30-39 MIN: ICD-10-PCS | Mod: S$PBB,,, | Performed by: PHYSICIAN ASSISTANT

## 2023-06-06 PROCEDURE — 99214 OFFICE O/P EST MOD 30 MIN: CPT | Mod: PBBFAC | Performed by: PHYSICIAN ASSISTANT

## 2023-06-06 PROCEDURE — 99214 OFFICE O/P EST MOD 30 MIN: CPT | Mod: S$PBB,,, | Performed by: PHYSICIAN ASSISTANT

## 2023-06-06 PROCEDURE — 80305 DRUG TEST PRSMV DIR OPT OBS: CPT | Mod: PBBFAC | Performed by: PHYSICIAN ASSISTANT

## 2023-06-06 RX ORDER — ACETAMINOPHEN AND CODEINE PHOSPHATE 300; 60 MG/1; MG/1
1 TABLET ORAL EVERY 6 HOURS PRN
Qty: 120 TABLET | Refills: 2 | Status: SHIPPED | OUTPATIENT
Start: 2023-06-06 | End: 2023-06-19

## 2023-06-06 RX ORDER — AMITRIPTYLINE HYDROCHLORIDE 75 MG/1
75 TABLET ORAL NIGHTLY PRN
Qty: 30 TABLET | Refills: 2 | Status: SHIPPED | OUTPATIENT
Start: 2023-06-06 | End: 2023-12-12

## 2023-06-06 RX ORDER — TIZANIDINE HYDROCHLORIDE 4 MG/1
4 CAPSULE, GELATIN COATED ORAL EVERY 8 HOURS PRN
Qty: 90 CAPSULE | Refills: 2 | Status: SHIPPED | OUTPATIENT
Start: 2023-06-06 | End: 2023-08-09

## 2023-06-19 ENCOUNTER — TELEPHONE (OUTPATIENT)
Dept: PAIN MEDICINE | Facility: CLINIC | Age: 78
End: 2023-06-19
Payer: MEDICARE

## 2023-06-19 LAB — BEAKER SEE SCANNED REPORT: NORMAL

## 2023-06-19 NOTE — TELEPHONE ENCOUNTER
EDOUARD MIX   06/19/2023   10:37 AM   Spoke with pharmacist at [facility],  tylenol #3 cancelled.  
 used

## 2023-07-10 ENCOUNTER — TELEPHONE (OUTPATIENT)
Dept: PAIN MEDICINE | Facility: CLINIC | Age: 78
End: 2023-07-10
Payer: MEDICARE

## 2023-07-10 NOTE — TELEPHONE ENCOUNTER
----- Message from Analisa Hernandez sent at 7/10/2023 10:40 AM CDT -----  [phone] PATIENT CALLED ABOUT HER TYLENOL 4 HAS BEEN STOPPED WANTS TO SPEAK TO A NURSE.  EDOUARD MIX   07/10/2023   1:11 PM   Informed patient she failed last drug screen and that's why meds were discontinued, will need to make an appt to discuss other options.

## 2023-07-18 ENCOUNTER — TELEPHONE (OUTPATIENT)
Dept: PAIN MEDICINE | Facility: CLINIC | Age: 78
End: 2023-07-18
Payer: MEDICARE

## 2023-07-18 NOTE — TELEPHONE ENCOUNTER
----- Message from Julieth Chery sent at 7/18/2023 11:28 AM CDT -----  Regarding: pt would like to speak to nurse  Pt would like to speak to nurse has questions please call pt back at [phone]  EDOUARD MIX   07/18/2023   11:40 AM   Will need to make an appt.

## 2023-08-09 ENCOUNTER — OFFICE VISIT (OUTPATIENT)
Dept: PAIN MEDICINE | Facility: CLINIC | Age: 78
End: 2023-08-09
Payer: MEDICARE

## 2023-08-09 VITALS
WEIGHT: 136 LBS | DIASTOLIC BLOOD PRESSURE: 93 MMHG | HEIGHT: 61 IN | SYSTOLIC BLOOD PRESSURE: 168 MMHG | BODY MASS INDEX: 25.68 KG/M2 | RESPIRATION RATE: 16 BRPM | HEART RATE: 105 BPM

## 2023-08-09 DIAGNOSIS — M47.814 THORACIC SPONDYLOSIS: Chronic | ICD-10-CM

## 2023-08-09 DIAGNOSIS — M47.817 LUMBOSACRAL SPONDYLOSIS WITHOUT MYELOPATHY: Primary | Chronic | ICD-10-CM

## 2023-08-09 DIAGNOSIS — Z79.899 ENCOUNTER FOR LONG-TERM (CURRENT) USE OF OTHER MEDICATIONS: ICD-10-CM

## 2023-08-09 DIAGNOSIS — M54.12 CERVICAL RADICULOPATHY: Chronic | ICD-10-CM

## 2023-08-09 LAB
CTP QC/QA: YES
POC (AMP) AMPHETAMINE: NEGATIVE
POC (BAR) BARBITURATES: ABNORMAL
POC (BUP) BUPRENORPHINE: NEGATIVE
POC (BZO) BENZODIAZEPINES: NEGATIVE
POC (COC) COCAINE: NEGATIVE
POC (MDMA) METHYLENEDIOXYMETHAMPHETAMINE 3,4: NEGATIVE
POC (MET) METHAMPHETAMINE: NEGATIVE
POC (MOP) OPIATES: NEGATIVE
POC (MTD) METHADONE: NEGATIVE
POC (OXY) OXYCODONE: NEGATIVE
POC (PCP) PHENCYCLIDINE: NEGATIVE
POC (TCA) TRICYCLIC ANTIDEPRESSANTS: NEGATIVE
POC TEMPERATURE (URINE): 92
POC THC: NEGATIVE

## 2023-08-09 PROCEDURE — 99214 PR OFFICE/OUTPT VISIT, EST, LEVL IV, 30-39 MIN: ICD-10-PCS | Mod: S$PBB,,, | Performed by: PHYSICIAN ASSISTANT

## 2023-08-09 PROCEDURE — 99214 OFFICE O/P EST MOD 30 MIN: CPT | Mod: PBBFAC | Performed by: PHYSICIAN ASSISTANT

## 2023-08-09 PROCEDURE — 80305 DRUG TEST PRSMV DIR OPT OBS: CPT | Mod: PBBFAC | Performed by: PHYSICIAN ASSISTANT

## 2023-08-09 PROCEDURE — 99214 OFFICE O/P EST MOD 30 MIN: CPT | Mod: S$PBB,,, | Performed by: PHYSICIAN ASSISTANT

## 2023-08-09 RX ORDER — BUPRENORPHINE 5 UG/H
1 PATCH TRANSDERMAL WEEKLY
Qty: 4 PATCH | Refills: 2 | Status: SHIPPED | OUTPATIENT
Start: 2023-08-09 | End: 2023-09-13 | Stop reason: SDUPTHER

## 2023-08-09 RX ORDER — CYCLOBENZAPRINE HCL 10 MG
10 TABLET ORAL 3 TIMES DAILY PRN
Qty: 30 TABLET | Refills: 2 | Status: SHIPPED | OUTPATIENT
Start: 2023-08-09 | End: 2023-09-13 | Stop reason: SDUPTHER

## 2023-08-09 NOTE — PROGRESS NOTES
Subjective:         Patient ID: Martina Guerra is a 77 y.o. female.    Chief Complaint: Neck Pain, Low-back Pain, and Foot Pain (bilateral)        Pain  This is a chronic problem. The current episode started more than 1 year ago. The problem occurs daily. The problem has been waxing and waning. Associated symptoms include arthralgias and neck pain. Pertinent negatives include no anorexia, chest pain, chills, coughing, diaphoresis, fever, sore throat, vertigo or vomiting.     Review of Systems   Constitutional:  Negative for activity change, appetite change, chills, diaphoresis, fever and unexpected weight change.   HENT:  Negative for drooling, ear discharge, ear pain, facial swelling, nosebleeds, sore throat, trouble swallowing, voice change and goiter.    Eyes:  Negative for photophobia, pain, discharge, redness and visual disturbance.   Respiratory:  Negative for apnea, cough, choking, chest tightness, shortness of breath, wheezing and stridor.    Cardiovascular:  Negative for chest pain, palpitations and leg swelling.   Gastrointestinal:  Negative for abdominal distention, anorexia, diarrhea, rectal pain, vomiting and fecal incontinence.   Endocrine: Negative for cold intolerance, heat intolerance, polydipsia, polyphagia and polyuria.   Genitourinary:  Negative for bladder incontinence, dysuria, flank pain, frequency and hot flashes.   Musculoskeletal:  Positive for arthralgias, back pain, leg pain, neck pain and neck stiffness.   Integumentary:  Negative for color change and pallor.   Allergic/Immunologic: Negative for immunocompromised state.   Neurological:  Negative for dizziness, vertigo, seizures, syncope, facial asymmetry, speech difficulty, light-headedness, coordination difficulties, memory loss and coordination difficulties.   Hematological:  Negative for adenopathy. Does not bruise/bleed easily.   Psychiatric/Behavioral:  Negative for agitation, behavioral problems, confusion, decreased  concentration, dysphoric mood, hallucinations, self-injury and suicidal ideas. The patient is not nervous/anxious and is not hyperactive.            Past Medical History:   Diagnosis Date    Asthma     Cervical radiculopathy     Chronic back pain     Chronic pain syndrome     Depressive disorder     Disorder of sacrum     Dyslipidemia     Enlarged liver     Essential (primary) hypertension     Glycosuria     Hypokalemia     Lumbar radiculopathy     Migraine     Mild chronic obstructive pulmonary disease     Neck pain     Osteoarthritis of multiple joints      Past Surgical History:   Procedure Laterality Date    ADENOIDECTOMY       SECTION      CHOLECYSTECTOMY      HYSTERECTOMY      INJECTION OF ANESTHETIC AGENT AROUND MEDIAL BRANCH NERVES INNERVATING LUMBAR FACET JOINT Bilateral 2021    Procedure: Block-nerve-medial branch-lumbar, bilateral L3 through S1;  Surgeon: Collette Chaney MD;  Location: [facility];  Service: Pain Management;  Laterality: Bilateral;    INJECTION OF FACET JOINT Bilateral 2019    Bilateral L3-S1 Facet Injection x3    KNEE SURGERY Right 2019    NECK SURGERY  2005    ORIF SUPRACONDYLAR FEMUR Right 2019    RADIOFREQUENCY THERMOCOAGULATION Left 10/09/2019    Left L3-S1 RFTC    RADIOFREQUENCY THERMOCOAGULATION Right 10/23/2019    Right L3-S1 RFTC    TONSILLECTOMY      TOTAL KNEE ARTHROPLASTY Bilateral      Social History     Socioeconomic History    Marital status:    Tobacco Use    Smoking status: Never    Smokeless tobacco: Never   Substance and Sexual Activity    Alcohol use: Never    Drug use: Yes     Types: Hydrocodone     Family History   Problem Relation Age of Onset    Stroke Mother     Hypertension Mother     Stroke Maternal Aunt     Abnormal EKG Paternal Uncle     Alcohol abuse Paternal Uncle     Stroke Maternal Grandmother      Review of patient's allergies indicates:   Allergen Reactions    Biaxin [clarithromycin] Other (See Comments)      "Stomach pain    Celebrex [celecoxib] Other (See Comments)     "Picking and cellulitis"    Opioids - morphine analogues Other (See Comments)     Morphine Makes me loopy     Zyrtec [cetirizine] Other (See Comments)     Stomach Ache        Objective:  Vitals:    08/09/23 1333   BP: (!) 168/93   Pulse: 105   Resp: 16   Weight: 61.7 kg (136 lb)   Height: 5' 1" (1.549 m)   PainSc: 10-Worst pain ever         Physical Exam  Vitals and nursing note reviewed. Exam conducted with a chaperone present.   Constitutional:       General: She is awake. She is not in acute distress.     Appearance: Normal appearance. She is not ill-appearing, toxic-appearing or diaphoretic.   HENT:      Head: Normocephalic and atraumatic.      Nose: Nose normal.      Mouth/Throat:      Mouth: Mucous membranes are moist.      Pharynx: Oropharynx is clear.   Eyes:      Conjunctiva/sclera: Conjunctivae normal.      Pupils: Pupils are equal, round, and reactive to light.   Cardiovascular:      Rate and Rhythm: Normal rate.   Pulmonary:      Effort: Pulmonary effort is normal. No respiratory distress.   Abdominal:      Palpations: Abdomen is soft.   Musculoskeletal:         General: Normal range of motion.      Cervical back: Normal range of motion and neck supple. Tenderness present.      Lumbar back: Tenderness present.   Skin:     General: Skin is warm and dry.      Coloration: Skin is not jaundiced or pale.   Neurological:      General: No focal deficit present.      Mental Status: She is alert and oriented to person, place, and time. Mental status is at baseline.      Cranial Nerves: No cranial nerve deficit (II-XII).   Psychiatric:         Mood and Affect: Mood normal.         Behavior: Behavior normal. Behavior is cooperative.         Thought Content: Thought content normal.           FL Fluoro for Pain Management  See OP Notes for results.     IMPRESSION: See OP Notes for results.     This procedure was auto-finalized by: Virtual Radiologist       "   Office Visit on 06/06/2023   Component Date Value Ref Range Status    POC Amphetamines 06/06/2023 Negative  Negative, Inconclusive Final    POC Barbiturates 06/06/2023 Presumptive Positive (A)  Negative, Inconclusive Final    POC Benzodiazepines 06/06/2023 Negative  Negative, Inconclusive Final    POC Cocaine 06/06/2023 Negative  Negative, Inconclusive Final    POC THC 06/06/2023 Negative  Negative, Inconclusive Final    POC Methadone 06/06/2023 Negative  Negative, Inconclusive Final    POC Methamphetamine 06/06/2023 Negative  Negative, Inconclusive Final    POC Opiates 06/06/2023 Presumptive Positive (A)  Negative, Inconclusive Final    POC Oxycodone 06/06/2023 Negative  Negative, Inconclusive Final    POC Phencyclidine 06/06/2023 Negative  Negative, Inconclusive Final    POC Methylenedioxymethamphetamine * 06/06/2023 Negative  Negative, Inconclusive Final    POC Tricyclic Antidepressants 06/06/2023 Negative  Negative, Inconclusive Final    POC Buprenorphine 06/06/2023 Negative   Final     Acceptable 06/06/2023 Yes   Final    POC Temperature (Urine) 06/06/2023 92   Final    See Scanned Report 06/06/2023 See Scanned Result   Final   Office Visit on 03/07/2023   Component Date Value Ref Range Status    POC Amphetamines 03/07/2023 Negative  Negative, Inconclusive Final    POC Barbiturates 03/07/2023 Presumptive Positive (A)  Negative, Inconclusive Final    POC Benzodiazepines 03/07/2023 Negative  Negative, Inconclusive Final    POC Cocaine 03/07/2023 Negative  Negative, Inconclusive Final    POC THC 03/07/2023 Negative  Negative, Inconclusive Final    POC Methadone 03/07/2023 Negative  Negative, Inconclusive Final    POC Methamphetamine 03/07/2023 Negative  Negative, Inconclusive Final    POC Opiates 03/07/2023 Presumptive Positive (A)  Negative, Inconclusive Final    POC Oxycodone 03/07/2023 Negative  Negative, Inconclusive Final    POC Phencyclidine 03/07/2023 Negative  Negative, Inconclusive Final     POC Methylenedioxymethamphetamine * 03/07/2023 Negative  Negative, Inconclusive Final    POC Tricyclic Antidepressants 03/07/2023 Negative  Negative, Inconclusive Final    POC Buprenorphine 03/07/2023 Negative   Final     Acceptable 03/07/2023 Yes   Final    POC Temperature (Urine) 03/07/2023 90   Final    See Scanned Report 03/07/2023 See Scanned Result   Final         No orders of the defined types were placed in this encounter.          Requested Prescriptions     Signed Prescriptions Disp Refills    buprenorphine 5 mcg/hour (BUTRANS) weekly patch 4 patch 2     Sig: Place 1 patch onto the skin once a week.    cyclobenzaprine (FLEXERIL) 10 MG tablet 30 tablet 2     Sig: Take 1 tablet (10 mg total) by mouth 3 (three) times daily as needed for Muscle spasms.       Assessment:     1. Lumbosacral spondylosis without myelopathy    2. Thoracic spondylosis    3. Cervical radiculopathy         A's of Opioid Risk Assessment  Activity:Patient can perform ADL.   Analgesia:Patients pain is partially controlled by current medication. Patient has tried OTC medications such as Tylenol and Ibuprofen with out relief.   Adverse Effects: Patient denies constipation or sedation.  Aberrant Behavior:  reviewed with no aberrant drug seeking/taking behavior.  Overdose reversal drug naloxone discussed    Drug screen reviewed      Plan:    June 19, 2023 definitive drug screen returned positive for morphine, hydrocodone and metabolite, patient takes Tylenol 3 drug screen is positive for codeine which would be consistent    Discontinue narcotics        Narcan March 2023 August 9, 2023 requesting assistance with chronic pain management opioid dependent     Discuss last urine drug screen with the patient and her son     Discussed options with Dr. Chaney     Trial Butrans 0.5 mcg topically q.week  Remove old patch    Continue home exercise program as directed    Continue current medications    Follow-up 3 months      Shiv August 2023    Bring original prescription medication bottles/container/box with labels to each visit

## 2023-09-12 ENCOUNTER — TELEPHONE (OUTPATIENT)
Dept: PAIN MEDICINE | Facility: CLINIC | Age: 78
End: 2023-09-12
Payer: MEDICARE

## 2023-09-12 NOTE — TELEPHONE ENCOUNTER
----- Message from Julieth Chery sent at 9/12/2023  8:14 AM CDT -----  Regarding: rx  Pt states the new med she was placed on the pain patches or flexeril is not helping her she is pain and not getting any relief please call pt back at [phone]  EDOUARD MIX   09/12/2023   2:06 PM   Made appt for tomorrow.

## 2023-09-12 NOTE — PROGRESS NOTES
Subjective:         Patient ID: Martina Guerra is a 77 y.o. female.    Chief Complaint: Shoulder Pain, Headache, and Neck Pain        Pain  This is a chronic problem. The current episode started more than 1 year ago. The problem occurs daily. The problem has been waxing and waning. Associated symptoms include arthralgias and neck pain. Pertinent negatives include no anorexia, chest pain, chills, coughing, diaphoresis, fever, sore throat, vertigo or vomiting.     Review of Systems   Constitutional:  Negative for activity change, appetite change, chills, diaphoresis, fever and unexpected weight change.   HENT:  Negative for drooling, ear discharge, ear pain, facial swelling, nosebleeds, sore throat, trouble swallowing, voice change and goiter.    Eyes:  Negative for photophobia, pain, discharge, redness and visual disturbance.   Respiratory:  Negative for apnea, cough, choking, chest tightness, shortness of breath, wheezing and stridor.    Cardiovascular:  Negative for chest pain, palpitations and leg swelling.   Gastrointestinal:  Negative for abdominal distention, anorexia, diarrhea, rectal pain, vomiting and fecal incontinence.   Endocrine: Negative for cold intolerance, heat intolerance, polydipsia, polyphagia and polyuria.   Genitourinary:  Negative for bladder incontinence, dysuria, flank pain, frequency and hot flashes.   Musculoskeletal:  Positive for arthralgias, back pain, leg pain, neck pain and neck stiffness.   Integumentary:  Negative for color change and pallor.   Allergic/Immunologic: Negative for immunocompromised state.   Neurological:  Negative for dizziness, vertigo, seizures, syncope, facial asymmetry, speech difficulty, light-headedness, coordination difficulties, memory loss and coordination difficulties.   Hematological:  Negative for adenopathy. Does not bruise/bleed easily.   Psychiatric/Behavioral:  Negative for agitation, behavioral problems, confusion, decreased concentration,  dysphoric mood, hallucinations, self-injury and suicidal ideas. The patient is not nervous/anxious and is not hyperactive.            Past Medical History:   Diagnosis Date    Asthma     Cervical radiculopathy     Chronic back pain     Chronic pain syndrome     Depressive disorder     Disorder of sacrum     Dyslipidemia     Enlarged liver     Essential (primary) hypertension     Glycosuria     Hypokalemia     Lumbar radiculopathy     Migraine     Mild chronic obstructive pulmonary disease     Neck pain     Osteoarthritis of multiple joints      Past Surgical History:   Procedure Laterality Date    ADENOIDECTOMY       SECTION      CHOLECYSTECTOMY      HYSTERECTOMY      INJECTION OF ANESTHETIC AGENT AROUND MEDIAL BRANCH NERVES INNERVATING LUMBAR FACET JOINT Bilateral 2021    Procedure: Block-nerve-medial branch-lumbar, bilateral L3 through S1;  Surgeon: Collette Chaney MD;  Location: [facility];  Service: Pain Management;  Laterality: Bilateral;    INJECTION OF FACET JOINT Bilateral 2019    Bilateral L3-S1 Facet Injection x3    KNEE SURGERY Right 2019    NECK SURGERY  2005    ORIF SUPRACONDYLAR FEMUR Right 2019    RADIOFREQUENCY THERMOCOAGULATION Left 10/09/2019    Left L3-S1 RFTC    RADIOFREQUENCY THERMOCOAGULATION Right 10/23/2019    Right L3-S1 RFTC    TONSILLECTOMY      TOTAL KNEE ARTHROPLASTY Bilateral      Social History     Socioeconomic History    Marital status:    Tobacco Use    Smoking status: Never    Smokeless tobacco: Never   Substance and Sexual Activity    Alcohol use: Never    Drug use: Yes     Types: Hydrocodone   Social History Narrative    ** Merged History Encounter **          Family History   Problem Relation Age of Onset    Stroke Mother     Hypertension Mother     Stroke Maternal Aunt     Abnormal EKG Paternal Uncle     Alcohol abuse Paternal Uncle     Stroke Maternal Grandmother      Review of patient's allergies indicates:   Allergen Reactions     "Biaxin [clarithromycin] Other (See Comments)     Stomach pain    Celebrex [celecoxib] Other (See Comments)     "Picking and cellulitis"    Opioids - morphine analogues Other (See Comments)     Morphine Makes me loopy     Zyrtec [cetirizine] Other (See Comments)     Stomach Ache        Objective:  Vitals:    09/13/23 1502   BP: (!) 140/66   Pulse: 91   Resp: 18   Weight: 64 kg (141 lb)   Height: 5' 1" (1.549 m)   PainSc:   9         Physical Exam  Vitals and nursing note reviewed. Exam conducted with a chaperone present.   Constitutional:       General: She is awake. She is not in acute distress.     Appearance: Normal appearance. She is not ill-appearing, toxic-appearing or diaphoretic.   HENT:      Head: Normocephalic and atraumatic.      Nose: Nose normal.      Mouth/Throat:      Mouth: Mucous membranes are moist.      Pharynx: Oropharynx is clear.   Eyes:      Conjunctiva/sclera: Conjunctivae normal.      Pupils: Pupils are equal, round, and reactive to light.   Cardiovascular:      Rate and Rhythm: Normal rate.   Pulmonary:      Effort: Pulmonary effort is normal. No respiratory distress.   Abdominal:      Palpations: Abdomen is soft.   Musculoskeletal:         General: Normal range of motion.      Cervical back: Normal range of motion and neck supple. Tenderness present.      Lumbar back: Tenderness present.   Skin:     General: Skin is warm and dry.      Coloration: Skin is not jaundiced or pale.   Neurological:      General: No focal deficit present.      Mental Status: She is alert and oriented to person, place, and time. Mental status is at baseline.      Cranial Nerves: No cranial nerve deficit (II-XII).   Psychiatric:         Mood and Affect: Mood normal.         Behavior: Behavior normal. Behavior is cooperative.         Thought Content: Thought content normal.           FL Fluoro for Pain Management  See OP Notes for results.     IMPRESSION: See OP Notes for results.     This procedure was " auto-finalized by: Virtual Radiologist         Office Visit on 08/09/2023   Component Date Value Ref Range Status    POC Amphetamines 08/09/2023 Negative  Negative, Inconclusive Final    POC Barbiturates 08/09/2023 Presumptive Positive (A)  Negative, Inconclusive Final    POC Benzodiazepines 08/09/2023 Negative  Negative, Inconclusive Final    POC Cocaine 08/09/2023 Negative  Negative, Inconclusive Final    POC THC 08/09/2023 Negative  Negative, Inconclusive Final    POC Methadone 08/09/2023 Negative  Negative, Inconclusive Final    POC Methamphetamine 08/09/2023 Negative  Negative, Inconclusive Final    POC Opiates 08/09/2023 Negative  Negative, Inconclusive Final    POC Oxycodone 08/09/2023 Negative  Negative, Inconclusive Final    POC Phencyclidine 08/09/2023 Negative  Negative, Inconclusive Final    POC Methylenedioxymethamphetamine * 08/09/2023 Negative  Negative, Inconclusive Final    POC Tricyclic Antidepressants 08/09/2023 Negative  Negative, Inconclusive Final    POC Buprenorphine 08/09/2023 Negative   Final     Acceptable 08/09/2023 Yes   Final    POC Temperature (Urine) 08/09/2023 92   Final   Office Visit on 06/06/2023   Component Date Value Ref Range Status    POC Amphetamines 06/06/2023 Negative  Negative, Inconclusive Final    POC Barbiturates 06/06/2023 Presumptive Positive (A)  Negative, Inconclusive Final    POC Benzodiazepines 06/06/2023 Negative  Negative, Inconclusive Final    POC Cocaine 06/06/2023 Negative  Negative, Inconclusive Final    POC THC 06/06/2023 Negative  Negative, Inconclusive Final    POC Methadone 06/06/2023 Negative  Negative, Inconclusive Final    POC Methamphetamine 06/06/2023 Negative  Negative, Inconclusive Final    POC Opiates 06/06/2023 Presumptive Positive (A)  Negative, Inconclusive Final    POC Oxycodone 06/06/2023 Negative  Negative, Inconclusive Final    POC Phencyclidine 06/06/2023 Negative  Negative, Inconclusive Final    POC  Methylenedioxymethamphetamine * 06/06/2023 Negative  Negative, Inconclusive Final    POC Tricyclic Antidepressants 06/06/2023 Negative  Negative, Inconclusive Final    POC Buprenorphine 06/06/2023 Negative   Final     Acceptable 06/06/2023 Yes   Final    POC Temperature (Urine) 06/06/2023 92   Final    See Scanned Report 06/06/2023 See Scanned Result   Final         No orders of the defined types were placed in this encounter.          Requested Prescriptions     Signed Prescriptions Disp Refills    cyclobenzaprine (FLEXERIL) 10 MG tablet 30 tablet 2     Sig: Take 1 tablet (10 mg total) by mouth 3 (three) times daily as needed for Muscle spasms.    buprenorphine 5 mcg/hour (BUTRANS) weekly patch 4 patch 2     Sig: Place 1 patch onto the skin once a week.       Assessment:     1. Cervical radiculopathy    2. Thoracic spondylosis    3. Lumbosacral spondylosis without myelopathy         A's of Opioid Risk Assessment  Activity:Patient can perform ADL.   Analgesia:Patients pain is partially controlled by current medication. Patient has tried OTC medications such as Tylenol and Ibuprofen with out relief.   Adverse Effects: Patient denies constipation or sedation.  Aberrant Behavior:  reviewed with no aberrant drug seeking/taking behavior.  Overdose reversal drug naloxone discussed    Drug screen reviewed      Plan:      Narcan March 2023    Complaint cervical spine discomfort worse with flexion extension rotation cervical spine ongoing for more 3 months    She denies loss of bowel or bladder function denies difficulty using her hands or arms     After discussing options will proceed with Toradol 30 mg IM, tolerated well    Considering cervical spine procedures medial branch block    Continue home exercise program as directed    Continue current medications    Follow-up 3 months    Dr. Chaney August 2023    Bring original prescription medication bottles/container/box with labels to each visit

## 2023-09-13 ENCOUNTER — OFFICE VISIT (OUTPATIENT)
Dept: PAIN MEDICINE | Facility: CLINIC | Age: 78
End: 2023-09-13
Payer: MEDICARE

## 2023-09-13 VITALS
HEIGHT: 61 IN | HEART RATE: 91 BPM | RESPIRATION RATE: 18 BRPM | SYSTOLIC BLOOD PRESSURE: 140 MMHG | DIASTOLIC BLOOD PRESSURE: 66 MMHG | WEIGHT: 141 LBS | BODY MASS INDEX: 26.62 KG/M2

## 2023-09-13 DIAGNOSIS — M47.817 LUMBOSACRAL SPONDYLOSIS WITHOUT MYELOPATHY: Chronic | ICD-10-CM

## 2023-09-13 DIAGNOSIS — M47.814 THORACIC SPONDYLOSIS: Chronic | ICD-10-CM

## 2023-09-13 DIAGNOSIS — M54.12 CERVICAL RADICULOPATHY: Primary | Chronic | ICD-10-CM

## 2023-09-13 PROCEDURE — 99999PBSHW PR PBB SHADOW TECHNICAL ONLY FILED TO HB: Mod: PBBFAC,,,

## 2023-09-13 PROCEDURE — 96372 THER/PROPH/DIAG INJ SC/IM: CPT | Mod: PBBFAC | Performed by: PHYSICIAN ASSISTANT

## 2023-09-13 PROCEDURE — 99214 PR OFFICE/OUTPT VISIT, EST, LEVL IV, 30-39 MIN: ICD-10-PCS | Mod: S$PBB,25,, | Performed by: PHYSICIAN ASSISTANT

## 2023-09-13 PROCEDURE — 99999PBSHW PR PBB SHADOW TECHNICAL ONLY FILED TO HB: ICD-10-PCS | Mod: PBBFAC,,,

## 2023-09-13 PROCEDURE — 99214 OFFICE O/P EST MOD 30 MIN: CPT | Mod: S$PBB,25,, | Performed by: PHYSICIAN ASSISTANT

## 2023-09-13 PROCEDURE — 99214 OFFICE O/P EST MOD 30 MIN: CPT | Mod: PBBFAC | Performed by: PHYSICIAN ASSISTANT

## 2023-09-13 RX ORDER — CYCLOBENZAPRINE HCL 10 MG
10 TABLET ORAL 3 TIMES DAILY PRN
Qty: 30 TABLET | Refills: 2 | Status: SHIPPED | OUTPATIENT
Start: 2023-09-13 | End: 2023-10-23 | Stop reason: SDUPTHER

## 2023-09-13 RX ORDER — KETOROLAC TROMETHAMINE 30 MG/ML
30 INJECTION, SOLUTION INTRAMUSCULAR; INTRAVENOUS
Status: COMPLETED | OUTPATIENT
Start: 2023-09-13 | End: 2023-09-13

## 2023-09-13 RX ORDER — BUPRENORPHINE 5 UG/H
1 PATCH TRANSDERMAL WEEKLY
Qty: 4 PATCH | Refills: 2 | Status: SHIPPED | OUTPATIENT
Start: 2023-10-05 | End: 2023-12-12

## 2023-09-13 RX ADMIN — KETOROLAC TROMETHAMINE 30 MG: 60 INJECTION, SOLUTION INTRAMUSCULAR at 03:09

## 2023-10-23 DIAGNOSIS — M47.814 THORACIC SPONDYLOSIS: Chronic | ICD-10-CM

## 2023-10-23 DIAGNOSIS — M54.12 CERVICAL RADICULOPATHY: Chronic | ICD-10-CM

## 2023-10-23 DIAGNOSIS — M47.817 LUMBOSACRAL SPONDYLOSIS WITHOUT MYELOPATHY: Chronic | ICD-10-CM

## 2023-10-23 RX ORDER — CYCLOBENZAPRINE HCL 10 MG
10 TABLET ORAL 3 TIMES DAILY PRN
Qty: 30 TABLET | Refills: 2 | Status: SHIPPED | OUTPATIENT
Start: 2023-10-23 | End: 2023-12-10 | Stop reason: SDUPTHER

## 2023-12-01 NOTE — PROGRESS NOTES
She Disclaimer: This note has been generated using voice-recognition software. There may be typographical errors that have been missed during proof-reading        Patient ID: Martina Guerra is a 78 y.o. female.      Chief Complaint: Mid-back Pain (All over)      78-year-old female returns for re-evaluation of intractable lower back, cervical, thoracic and feet pain right greater than left.  Her pain is poorly relieved with current Butrans patch dose. She received lumbar medial branch blocks June 2021 and experienced 100% pain relief for several days,  but did not return for the 2nd diagnostic block.  Over the past several months, her pain has become severe and intractable.  She notes insomnia but was unable to tolerate Elavil secondary to weight gain.  She has not received updated x-rays of the cervical, thoracic or lumbar spine.  I will increase Butrans to 10 micrograms/hour, obtain x-rays and consider physical therapy  versus nerve block injections.  I will also start Neurontin for neuropathic pain involving the lower extremities.                  Pain Assessment  Pain Assessment: 0-10  Pain Score:   8  Pain Location: Back (all over)  Pain Descriptors: Sharp, Dull  Pain Frequency: Constant/continuous  Pain Onset: Awakened from sleep  Clinical Progression: Not changed  Aggravating Factors: Bending, Walking, Standing  Pain Intervention(s): Medication (See eMAR), Home medication, Heat applied      A's of Opioid Risk Assessment  Activity:Patient can not perform ADL.   Analgesia:Patients pain is not controlled by current medication.   Adverse Effects: Patient denies constipation or sedation.  Aberrant Behavior:  reviewed with no aberrant drug seeking/taking behavior.      Patient denies any suicidal or homicidal ideations    Physical Therapy/Home Exercise: yes      X-Ray Cervical Spine AP Lat with Flexion  Extension  Narrative: EXAMINATION:  XR CERVICAL SPINE AP LAT WITH FLEX EXTEN    CLINICAL  HISTORY:  Radiculopathy, cervical region    TECHNIQUE:  AP and lateral views of the cervical spine plus flexion and extension views were performed.    COMPARISON:  None    FINDINGS:  Postsurgical changes are seen from C3 through C6 ACDF.  The hardware appears in expected alignment.  There is anterolisthesis of C6 on C7 of 3 mm.  Degenerative endplate changes throughout.  Facet degeneration is moderate with osteophyte formation and sclerosis.  There is no change in alignment on the dynamic images.  Impression: Multilevel degenerative changes.  Anterior fusion surgical changes C3 through C6.    Electronically signed by: Jordan Carmen  Date:    12/04/2023  Time:    15:55  X-Ray Sacroiliac Joints Complete  Narrative: EXAMINATION:  XR LUMBAR SPINE 5 VIEW WITH FLEX AND EXT; XR SACROILIAC JOINTS COMPLETE    CLINICAL HISTORY:  Spondylosis without myelopathy or radiculopathy, lumbar region; Sacroiliitis, not elsewhere classified    TECHNIQUE:  XR LUMBAR SPINE 5 VIEW WITH FLEX AND EXT; XR SACROILIAC JOINTS COMPLETE    COMPARISON:  2018    FINDINGS:  No acute fracture.    Grade 1 retrolisthesis L1 on L2, L2 on L3 and L3 on L4 as well as L4 on L5.    No dynamic instability.    Severe degenerative disc disease L1-L2 and L2-L3.  Moderate multilevel degenerative disease otherwise.    Multilevel endplate change.  Multilevel anterior osteophytes.    Multilevel facet change.  Multilevel neural foraminal narrowing, most significant at L4-5 and L5-S1.    Mild degenerative change of the sacroiliac joints.  No evidence of sacroiliitis.  Impression: No acute fracture.    No dynamic instability.    Severe degenerative disc disease L1-L2 and L2-L3. Moderate multilevel degenerative disease otherwise.    Multilevel endplate change. Multilevel anterior osteophytes.    Multilevel facet change. Multilevel neural foraminal narrowing, most significant at L4-5 and L5-S1.    Mild degenerative change of the sacroiliac joints.  No evidence of  sacroiliitis.    Electronically signed by: Llye Lugo  Date:    12/04/2023  Time:    15:53  X-Ray Lumbar Complete Including Flex And Ext  Narrative: EXAMINATION:  XR LUMBAR SPINE 5 VIEW WITH FLEX AND EXT; XR SACROILIAC JOINTS COMPLETE    CLINICAL HISTORY:  Spondylosis without myelopathy or radiculopathy, lumbar region; Sacroiliitis, not elsewhere classified    TECHNIQUE:  XR LUMBAR SPINE 5 VIEW WITH FLEX AND EXT; XR SACROILIAC JOINTS COMPLETE    COMPARISON:  2018    FINDINGS:  No acute fracture.    Grade 1 retrolisthesis L1 on L2, L2 on L3 and L3 on L4 as well as L4 on L5.    No dynamic instability.    Severe degenerative disc disease L1-L2 and L2-L3.  Moderate multilevel degenerative disease otherwise.    Multilevel endplate change.  Multilevel anterior osteophytes.    Multilevel facet change.  Multilevel neural foraminal narrowing, most significant at L4-5 and L5-S1.    Mild degenerative change of the sacroiliac joints.  No evidence of sacroiliitis.  Impression: No acute fracture.    No dynamic instability.    Severe degenerative disc disease L1-L2 and L2-L3. Moderate multilevel degenerative disease otherwise.    Multilevel endplate change. Multilevel anterior osteophytes.    Multilevel facet change. Multilevel neural foraminal narrowing, most significant at L4-5 and L5-S1.    Mild degenerative change of the sacroiliac joints.  No evidence of sacroiliitis.    Electronically signed by: Lyle Lugo  Date:    12/04/2023  Time:    15:53  X-Ray Thoracic Spine AP Lateral  Narrative: EXAMINATION:  XR THORACIC SPINE AP LATERAL    CLINICAL HISTORY:  Spondylosis without myelopathy or radiculopathy, thoracic region    TECHNIQUE:  AP and lateral views of the thoracic spine were performed.    COMPARISON:  03/21/2018    FINDINGS:  There is a kyphotic curvature at the thoracolumbar junction seen on the lateral view that is similar to what is seen on 03/21/2018.  The sagittal alignment otherwise unremarkable.   Degenerative endplate changes are seen throughout with osteophyte formation and disc height loss.  Impression: Degenerative changes.    Electronically signed by: Jordan Carmen  Date:    2023  Time:    15:53      Review of Systems   Constitutional: Negative.    HENT: Negative.     Eyes: Negative.    Respiratory: Negative.     Cardiovascular: Negative.    Gastrointestinal: Negative.    Endocrine: Negative.    Genitourinary: Negative.    Musculoskeletal:  Positive for arthralgias, back pain and neck pain.   Integumentary:  Negative.   Neurological: Negative.    Hematological: Negative.    Psychiatric/Behavioral: Negative.               Past Medical History:   Diagnosis Date    Asthma     Cervical radiculopathy     Chronic back pain     Chronic pain syndrome     Depressive disorder     Disorder of sacrum     Dyslipidemia     Enlarged liver     Essential (primary) hypertension     Glycosuria     Hypokalemia     Lumbar radiculopathy     Migraine     Mild chronic obstructive pulmonary disease     Neck pain     Osteoarthritis of multiple joints      Past Surgical History:   Procedure Laterality Date    ADENOIDECTOMY       SECTION      CHOLECYSTECTOMY      HYSTERECTOMY      INJECTION OF ANESTHETIC AGENT AROUND MEDIAL BRANCH NERVES INNERVATING LUMBAR FACET JOINT Bilateral 2021    Procedure: Block-nerve-medial branch-lumbar, bilateral L3 through S1;  Surgeon: Collette Chaney MD;  Location: [facility];  Service: Pain Management;  Laterality: Bilateral;    INJECTION OF FACET JOINT Bilateral 2019    Bilateral L3-S1 Facet Injection x3    KNEE SURGERY Right 2019    NECK SURGERY  2005    ORIF SUPRACONDYLAR FEMUR Right 2019    RADIOFREQUENCY THERMOCOAGULATION Left 10/09/2019    Left L3-S1 [facility]    RADIOFREQUENCY THERMOCOAGULATION Right 10/23/2019    Right L3-S1 [facility]    TONSILLECTOMY      TOTAL KNEE ARTHROPLASTY Bilateral      Social History     Socioeconomic History    Marital status:   "  Tobacco Use    Smoking status: Never    Smokeless tobacco: Never   Substance and Sexual Activity    Alcohol use: Never    Drug use: Yes     Types: Hydrocodone   Social History Narrative    ** Merged History Encounter **          Family History   Problem Relation Age of Onset    Stroke Mother     Hypertension Mother     Stroke Maternal Aunt     Abnormal EKG Paternal Uncle     Alcohol abuse Paternal Uncle     Stroke Maternal Grandmother      Review of patient's allergies indicates:   Allergen Reactions    Biaxin [clarithromycin] Other (See Comments)     Stomach pain    Celebrex [celecoxib] Other (See Comments)     "Picking and cellulitis"    Opioids - morphine analogues Other (See Comments)     Morphine Makes me loopy     Zyrtec [cetirizine] Other (See Comments)     Stomach Ache     has a current medication list which includes the following prescription(s): amitriptyline, buprenorphine 5 mcg/hour, carvedilol, cyclobenzaprine, duloxetine, gabapentin, ipratropium-albuterol, meloxicam, montelukast, ondansetron, pantoprazole, potassium chloride sa, primidone, venlafaxine, buprenorphine 10 mcg/hr, and gabapentin.      Objective:  Vitals:    12/04/23 1402   BP: (!) 165/89   Pulse: 108   Resp: 18        Physical Exam  Vitals and nursing note reviewed.   Constitutional:       General: She is not in acute distress.     Appearance: Normal appearance. She is not ill-appearing, toxic-appearing or diaphoretic.   HENT:      Head: Normocephalic and atraumatic.      Nose: Nose normal.      Mouth/Throat:      Mouth: Mucous membranes are moist.   Eyes:      Extraocular Movements: Extraocular movements intact.      Pupils: Pupils are equal, round, and reactive to light.   Cardiovascular:      Rate and Rhythm: Normal rate and regular rhythm.      Heart sounds: Normal heart sounds.   Pulmonary:      Effort: Pulmonary effort is normal. No respiratory distress.      Breath sounds: Normal breath sounds. No stridor. No wheezing or rhonchi. "   Abdominal:      General: Bowel sounds are normal.      Palpations: Abdomen is soft.   Musculoskeletal:         General: No swelling or deformity.      Cervical back: Normal and normal range of motion. No spasms or tenderness. No pain with movement. Normal range of motion.      Thoracic back: Normal.      Lumbar back: Tenderness and bony tenderness present. No spasms. Decreased range of motion. Negative right straight leg raise test and negative left straight leg raise test. No scoliosis.      Right lower leg: No edema.      Left lower leg: No edema.      Comments: Pain with flexion, extension and lateral rotation.  Bilateral facet tenderness to palpation from L3-S1   Skin:     General: Skin is warm.   Neurological:      General: No focal deficit present.      Mental Status: She is alert and oriented to person, place, and time. Mental status is at baseline.      Cranial Nerves: No cranial nerve deficit.      Sensory: Sensation is intact. No sensory deficit.      Motor: No weakness.      Coordination: Coordination normal.      Gait: Gait normal.      Deep Tendon Reflexes: Reflexes are normal and symmetric.   Psychiatric:         Mood and Affect: Mood normal.         Behavior: Behavior normal.         Assessment:      1. Lumbar spondylosis    2. Thoracic spondylosis    3. Cervical radiculopathy    4. Bilateral sacroiliitis          Plan:  1. reviewed  2.Addiction, Dependency, Tolerance, Opioid abuse-misuse, Death, Diversion Discussed. Overdose reversal drug Naloxone discussed.  3.Refill/Continue medications for pain control and function. Increase Butrans to 10u/hr for better pain control and start neurontin 300mg TID for neuropathy       Requested Prescriptions     Signed Prescriptions Disp Refills    buprenorphine 10 mcg/hr (BUTRANS) weekly patch 4 patch 0     Sig: Place 1 patch onto the skin once a week.    gabapentin (NEURONTIN) 300 MG capsule 90 capsule 0     Sig: Take 1 capsule (300 mg total) by mouth 3  (three) times daily.     4.Obtain X-rays of the cervical, thoracic, lumbar spine and SI joints    Orders Placed This Encounter   Procedures    X-Ray Thoracic Spine AP Lateral     Standing Status:   Future     Number of Occurrences:   1     Standing Expiration Date:   12/4/2024     Order Specific Question:   May the Radiologist modify the order per protocol to meet the clinical needs of the patient?     Answer:   Yes     Order Specific Question:   Release to patient     Answer:   Immediate    X-Ray Cervical Spine AP Lat with Flexion  Extension     Standing Status:   Future     Number of Occurrences:   1     Standing Expiration Date:   12/4/2024     Order Specific Question:   May the Radiologist modify the order per protocol to meet the clinical needs of the patient?     Answer:   Yes     Order Specific Question:   Release to patient     Answer:   Immediate    X-Ray Lumbar Complete Including Flex And Ext     Standing Status:   Future     Number of Occurrences:   1     Standing Expiration Date:   12/4/2024     Order Specific Question:   May the Radiologist modify the order per protocol to meet the clinical needs of the patient?     Answer:   Yes    X-Ray Sacroiliac Joints Complete     Standing Status:   Future     Number of Occurrences:   1     Standing Expiration Date:   12/4/2024     Order Specific Question:   May the Radiologist modify the order per protocol to meet the clinical needs of the patient?     Answer:   Yes     Order Specific Question:   Release to patient     Answer:   Immediate      5. Consider  injections pending diagnostic results  6. Return in 1 month for re-evaluation and medication adjustment           report:  Reviewed and consistent with medication use as prescribed.      The total time spent for evaluation and management on 12/05/2023 including reviewing separately obtained history, performing a medically appropriate exam and evaluation, documenting clinical information in the health record,  independently interpreting results and communicating them to the patient/family/caregiver, and ordering medications/tests/procedures was between 15-29 minutes.    The above plan and management options were discussed at length with patient. Patient is in agreement with the above and verbalized understanding. It will be communicated with the referring physician via electronic record, fax, or mail.

## 2023-12-04 ENCOUNTER — OFFICE VISIT (OUTPATIENT)
Dept: PAIN MEDICINE | Facility: CLINIC | Age: 78
End: 2023-12-04
Payer: MEDICARE

## 2023-12-04 ENCOUNTER — HOSPITAL ENCOUNTER (OUTPATIENT)
Dept: RADIOLOGY | Facility: HOSPITAL | Age: 78
Discharge: HOME | End: 2023-12-04
Attending: PAIN MEDICINE
Payer: MEDICARE

## 2023-12-04 VITALS
BODY MASS INDEX: 28.89 KG/M2 | HEART RATE: 108 BPM | WEIGHT: 153 LBS | RESPIRATION RATE: 18 BRPM | HEIGHT: 61 IN | DIASTOLIC BLOOD PRESSURE: 89 MMHG | SYSTOLIC BLOOD PRESSURE: 165 MMHG

## 2023-12-04 DIAGNOSIS — M54.12 CERVICAL RADICULOPATHY: ICD-10-CM

## 2023-12-04 DIAGNOSIS — M47.816 LUMBAR SPONDYLOSIS: ICD-10-CM

## 2023-12-04 DIAGNOSIS — M47.814 THORACIC SPONDYLOSIS: ICD-10-CM

## 2023-12-04 DIAGNOSIS — M47.816 LUMBAR SPONDYLOSIS: Primary | ICD-10-CM

## 2023-12-04 DIAGNOSIS — M46.1 BILATERAL SACROILIITIS: ICD-10-CM

## 2023-12-04 PROCEDURE — 99214 PR OFFICE/OUTPT VISIT, EST, LEVL IV, 30-39 MIN: ICD-10-PCS | Mod: S$PBB,,, | Performed by: PAIN MEDICINE

## 2023-12-04 PROCEDURE — 72050 X-RAY EXAM NECK SPINE 4/5VWS: CPT | Mod: TC

## 2023-12-04 PROCEDURE — 72050 X-RAY EXAM NECK SPINE 4/5VWS: CPT | Mod: 26,,, | Performed by: RADIOLOGY

## 2023-12-04 PROCEDURE — 72070 X-RAY EXAM THORAC SPINE 2VWS: CPT | Mod: 26,,, | Performed by: RADIOLOGY

## 2023-12-04 PROCEDURE — 72202 X-RAY EXAM SI JOINTS 3/> VWS: CPT | Mod: 26,,, | Performed by: STUDENT IN AN ORGANIZED HEALTH CARE EDUCATION/TRAINING PROGRAM

## 2023-12-04 PROCEDURE — 99215 OFFICE O/P EST HI 40 MIN: CPT | Mod: PBBFAC | Performed by: PAIN MEDICINE

## 2023-12-04 PROCEDURE — 72050 XR CERVICAL SPINE AP LAT WITH FLEX EXTEN: ICD-10-PCS | Mod: 26,,, | Performed by: RADIOLOGY

## 2023-12-04 PROCEDURE — 72114 XR LUMBAR SPINE 5 VIEW WITH FLEX AND EXT: ICD-10-PCS | Mod: 26,,, | Performed by: STUDENT IN AN ORGANIZED HEALTH CARE EDUCATION/TRAINING PROGRAM

## 2023-12-04 PROCEDURE — 72202 X-RAY EXAM SI JOINTS 3/> VWS: CPT | Mod: TC

## 2023-12-04 PROCEDURE — 72114 X-RAY EXAM L-S SPINE BENDING: CPT | Mod: 26,,, | Performed by: STUDENT IN AN ORGANIZED HEALTH CARE EDUCATION/TRAINING PROGRAM

## 2023-12-04 PROCEDURE — 72070 X-RAY EXAM THORAC SPINE 2VWS: CPT | Mod: TC

## 2023-12-04 PROCEDURE — 72070 XR THORACIC SPINE AP LATERAL: ICD-10-PCS | Mod: 26,,, | Performed by: RADIOLOGY

## 2023-12-04 PROCEDURE — 72202 XR SACROILIAC JOINTS COMPLETE: ICD-10-PCS | Mod: 26,,, | Performed by: STUDENT IN AN ORGANIZED HEALTH CARE EDUCATION/TRAINING PROGRAM

## 2023-12-04 PROCEDURE — 72114 X-RAY EXAM L-S SPINE BENDING: CPT | Mod: TC

## 2023-12-04 PROCEDURE — 99214 OFFICE O/P EST MOD 30 MIN: CPT | Mod: S$PBB,,, | Performed by: PAIN MEDICINE

## 2023-12-04 RX ORDER — GABAPENTIN 300 MG/1
300 CAPSULE ORAL 3 TIMES DAILY
Qty: 90 CAPSULE | Refills: 0 | Status: SHIPPED | OUTPATIENT
Start: 2023-12-04 | End: 2023-12-12 | Stop reason: SDUPTHER

## 2023-12-04 RX ORDER — BUPRENORPHINE 10 UG/H
1 PATCH TRANSDERMAL WEEKLY
Qty: 4 PATCH | Refills: 0 | Status: SHIPPED | OUTPATIENT
Start: 2023-12-04 | End: 2023-12-12

## 2023-12-10 DIAGNOSIS — M47.814 THORACIC SPONDYLOSIS: Chronic | ICD-10-CM

## 2023-12-10 DIAGNOSIS — M54.12 CERVICAL RADICULOPATHY: Chronic | ICD-10-CM

## 2023-12-10 DIAGNOSIS — M47.817 LUMBOSACRAL SPONDYLOSIS WITHOUT MYELOPATHY: Chronic | ICD-10-CM

## 2023-12-10 RX ORDER — CYCLOBENZAPRINE HCL 10 MG
10 TABLET ORAL 3 TIMES DAILY PRN
Qty: 30 TABLET | Refills: 2 | Status: SHIPPED | OUTPATIENT
Start: 2023-12-10 | End: 2023-12-12 | Stop reason: SDUPTHER

## 2023-12-11 ENCOUNTER — TELEPHONE (OUTPATIENT)
Dept: PAIN MEDICINE | Facility: CLINIC | Age: 78
End: 2023-12-11
Payer: MEDICARE

## 2023-12-11 NOTE — TELEPHONE ENCOUNTER
Called to let Mrs. Guerra know that her Flexeril was sent in to her pharmacy. I was unable to reach her but I left her a voicemail letting her know.

## 2023-12-11 NOTE — TELEPHONE ENCOUNTER
----- Message from Collette Chaney MD sent at 12/10/2023  6:32 PM CST -----  Regarding: RE: rx  Flexeril sent to patient's pharmacy  ----- Message -----  From: Frances Woodard MA  Sent: 12/8/2023  12:24 PM CST  To: Collette Chaney MD  Subject: FW: rx                                           NEEDS FLEXERIL RX PLEASE  ----- Message -----  From: Julieth Chery  Sent: 12/6/2023   9:54 AM CST  To: Shiv Murdock Staff  Subject: rx                                               Pt was in office 12-5-23 pt states she did not get a refill on flexeril and would like to get that done please call pt back at [phone]

## 2023-12-12 ENCOUNTER — TELEPHONE (OUTPATIENT)
Dept: PAIN MEDICINE | Facility: CLINIC | Age: 78
End: 2023-12-12
Payer: MEDICARE

## 2023-12-12 DIAGNOSIS — M54.12 CERVICAL RADICULOPATHY: ICD-10-CM

## 2023-12-12 DIAGNOSIS — M47.814 THORACIC SPONDYLOSIS: ICD-10-CM

## 2023-12-12 DIAGNOSIS — M47.817 LUMBOSACRAL SPONDYLOSIS WITHOUT MYELOPATHY: Primary | ICD-10-CM

## 2023-12-12 DIAGNOSIS — M47.817 LUMBOSACRAL SPONDYLOSIS WITHOUT MYELOPATHY: ICD-10-CM

## 2023-12-12 DIAGNOSIS — M46.1 BILATERAL SACROILIITIS: ICD-10-CM

## 2023-12-12 RX ORDER — BUPRENORPHINE 10 UG/H
1 PATCH TRANSDERMAL WEEKLY
Qty: 4 PATCH | Refills: 1 | Status: SHIPPED | OUTPATIENT
Start: 2024-01-09

## 2023-12-12 RX ORDER — CYCLOBENZAPRINE HCL 10 MG
10 TABLET ORAL 3 TIMES DAILY PRN
Qty: 30 TABLET | Refills: 2 | Status: SHIPPED | OUTPATIENT
Start: 2023-12-12

## 2023-12-12 RX ORDER — BUPRENORPHINE 10 UG/H
1 PATCH TRANSDERMAL WEEKLY
Qty: 4 PATCH | Refills: 0 | Status: SHIPPED | OUTPATIENT
Start: 2023-12-12 | End: 2023-12-12 | Stop reason: SDUPTHER

## 2023-12-12 RX ORDER — GABAPENTIN 300 MG/1
300 CAPSULE ORAL 3 TIMES DAILY
Qty: 90 CAPSULE | Refills: 1 | Status: SHIPPED | OUTPATIENT
Start: 2024-01-03

## 2023-12-12 NOTE — TELEPHONE ENCOUNTER
EDOUARD MIX   12/12/2023   3:46 PM   Spoke with patient, meds refilled per López GUAMAN, new appt made.

## 2024-07-24 ENCOUNTER — TELEPHONE (OUTPATIENT)
Dept: PAIN MEDICINE | Facility: CLINIC | Age: 79
End: 2024-07-24
Payer: MEDICARE

## 2024-07-24 NOTE — TELEPHONE ENCOUNTER
----- Message from Prince Zarate LPN sent at 7/24/2024  1:41 PM CDT -----    ----- Message -----  From: Ashley Okeefe  Sent: 7/24/2024  12:27 PM CDT  To: Susu RODRIGUEZ Staff    Pt needs a refill on Flexeril please call pt back at [phone]    Patient is instructed she will need to make follow to get medication refills. TC

## (undated) DEVICE — NEEDLE SPINAL 22GX3.5 QUINCHE (KC)

## (undated) DEVICE — APPLICATOR CHLORAPREP HI-LITE TINTED ORANGE 26ML

## (undated) DEVICE — TRAY NERVE BLOCK (KC) PMA

## (undated) DEVICE — CATH IV JELCO 24GX3/4

## (undated) DEVICE — KIT IV START 849

## (undated) DEVICE — GLOVE SURGICAL PROTEXIS PI SIZE 6.5

## (undated) DEVICE — SET IV SOL CONTIN-FLOW 10 DROP/ML (PRIMARY)

## (undated) DEVICE — CATH IV JELCO 22GX1 IN